# Patient Record
Sex: FEMALE
[De-identification: names, ages, dates, MRNs, and addresses within clinical notes are randomized per-mention and may not be internally consistent; named-entity substitution may affect disease eponyms.]

---

## 2018-12-06 ENCOUNTER — APPOINTMENT (OUTPATIENT)
Dept: CARDIOLOGY | Facility: CLINIC | Age: 83
End: 2018-12-06

## 2018-12-06 PROBLEM — Z00.00 ENCOUNTER FOR PREVENTIVE HEALTH EXAMINATION: Status: ACTIVE | Noted: 2018-12-06

## 2018-12-21 ENCOUNTER — APPOINTMENT (OUTPATIENT)
Dept: CARDIOLOGY | Facility: CLINIC | Age: 83
End: 2018-12-21
Payer: MEDICARE

## 2018-12-21 DIAGNOSIS — R09.89 OTHER SPECIFIED SYMPTOMS AND SIGNS INVOLVING THE CIRCULATORY AND RESPIRATORY SYSTEMS: ICD-10-CM

## 2018-12-21 PROCEDURE — 93880 EXTRACRANIAL BILAT STUDY: CPT

## 2018-12-23 PROBLEM — R09.89 CAROTID BRUIT: Status: ACTIVE | Noted: 2018-12-23

## 2019-05-28 ENCOUNTER — NON-APPOINTMENT (OUTPATIENT)
Age: 84
End: 2019-05-28

## 2019-05-28 ENCOUNTER — APPOINTMENT (OUTPATIENT)
Dept: CARDIOLOGY | Facility: CLINIC | Age: 84
End: 2019-05-28
Payer: MEDICARE

## 2019-05-28 VITALS
HEART RATE: 56 BPM | SYSTOLIC BLOOD PRESSURE: 134 MMHG | DIASTOLIC BLOOD PRESSURE: 82 MMHG | HEIGHT: 61 IN | BODY MASS INDEX: 28.7 KG/M2 | WEIGHT: 152 LBS | RESPIRATION RATE: 16 BRPM

## 2019-05-28 PROCEDURE — 99204 OFFICE O/P NEW MOD 45 MIN: CPT

## 2019-05-28 PROCEDURE — 93000 ELECTROCARDIOGRAM COMPLETE: CPT

## 2019-06-10 ENCOUNTER — APPOINTMENT (OUTPATIENT)
Dept: CARDIOLOGY | Facility: CLINIC | Age: 84
End: 2019-06-10
Payer: MEDICARE

## 2019-06-10 DIAGNOSIS — R42 DIZZINESS AND GIDDINESS: ICD-10-CM

## 2019-06-10 PROCEDURE — 93306 TTE W/DOPPLER COMPLETE: CPT

## 2019-06-17 PROBLEM — R42 DIZZINESS: Status: ACTIVE | Noted: 2018-12-23

## 2019-06-18 RX ORDER — LOSARTAN POTASSIUM 100 MG/1
100 TABLET, FILM COATED ORAL
Qty: 90 | Refills: 0 | Status: ACTIVE | COMMUNITY

## 2019-06-18 RX ORDER — MONTELUKAST SODIUM 10 MG/1
10 TABLET, FILM COATED ORAL DAILY
Qty: 90 | Refills: 3 | Status: ACTIVE | COMMUNITY

## 2019-06-18 RX ORDER — POTASSIUM CHLORIDE 750 MG/1
10 TABLET, FILM COATED, EXTENDED RELEASE ORAL DAILY
Refills: 0 | Status: ACTIVE | COMMUNITY

## 2019-06-18 RX ORDER — AMLODIPINE BESYLATE 5 MG/1
5 TABLET ORAL DAILY
Qty: 90 | Refills: 0 | Status: ACTIVE | COMMUNITY

## 2020-01-28 ENCOUNTER — LABORATORY RESULT (OUTPATIENT)
Age: 85
End: 2020-01-28

## 2020-01-29 ENCOUNTER — NON-APPOINTMENT (OUTPATIENT)
Age: 85
End: 2020-01-29

## 2020-01-29 ENCOUNTER — APPOINTMENT (OUTPATIENT)
Dept: CARDIOLOGY | Facility: CLINIC | Age: 85
End: 2020-01-29
Payer: MEDICARE

## 2020-01-29 VITALS
HEIGHT: 61 IN | RESPIRATION RATE: 16 BRPM | SYSTOLIC BLOOD PRESSURE: 130 MMHG | BODY MASS INDEX: 27.75 KG/M2 | HEART RATE: 60 BPM | DIASTOLIC BLOOD PRESSURE: 84 MMHG | WEIGHT: 147 LBS

## 2020-01-29 PROCEDURE — 93000 ELECTROCARDIOGRAM COMPLETE: CPT | Mod: 59

## 2020-01-29 PROCEDURE — 99214 OFFICE O/P EST MOD 30 MIN: CPT

## 2020-01-29 PROCEDURE — 93224 XTRNL ECG REC UP TO 48 HRS: CPT

## 2020-01-29 RX ORDER — ROSUVASTATIN CALCIUM 10 MG/1
10 TABLET, FILM COATED ORAL
Qty: 90 | Refills: 1 | Status: ACTIVE | COMMUNITY
Start: 1900-01-01 | End: 1900-01-01

## 2020-02-13 ENCOUNTER — NON-APPOINTMENT (OUTPATIENT)
Age: 85
End: 2020-02-13

## 2020-02-19 ENCOUNTER — APPOINTMENT (OUTPATIENT)
Dept: CV DIAGNOSTICS | Facility: HOSPITAL | Age: 85
End: 2020-02-19

## 2020-02-19 ENCOUNTER — OUTPATIENT (OUTPATIENT)
Dept: OUTPATIENT SERVICES | Facility: HOSPITAL | Age: 85
LOS: 1 days | End: 2020-02-19
Payer: MEDICARE

## 2020-02-19 DIAGNOSIS — R06.09 OTHER FORMS OF DYSPNEA: ICD-10-CM

## 2020-02-19 PROCEDURE — A9500: CPT

## 2020-02-19 PROCEDURE — 93017 CV STRESS TEST TRACING ONLY: CPT

## 2020-02-19 PROCEDURE — 93016 CV STRESS TEST SUPVJ ONLY: CPT

## 2020-02-19 PROCEDURE — 78452 HT MUSCLE IMAGE SPECT MULT: CPT | Mod: 26

## 2020-02-19 PROCEDURE — 93018 CV STRESS TEST I&R ONLY: CPT

## 2020-02-19 PROCEDURE — 78452 HT MUSCLE IMAGE SPECT MULT: CPT

## 2020-03-05 ENCOUNTER — APPOINTMENT (OUTPATIENT)
Dept: CARDIOLOGY | Facility: CLINIC | Age: 85
End: 2020-03-05
Payer: MEDICARE

## 2020-03-05 VITALS
HEIGHT: 61 IN | WEIGHT: 149 LBS | BODY MASS INDEX: 28.13 KG/M2 | DIASTOLIC BLOOD PRESSURE: 85 MMHG | SYSTOLIC BLOOD PRESSURE: 125 MMHG | HEART RATE: 65 BPM | RESPIRATION RATE: 15 BRPM

## 2020-03-05 DIAGNOSIS — I65.29 OCCLUSION AND STENOSIS OF UNSPECIFIED CAROTID ARTERY: ICD-10-CM

## 2020-03-05 PROCEDURE — 99213 OFFICE O/P EST LOW 20 MIN: CPT

## 2022-12-16 ENCOUNTER — APPOINTMENT (OUTPATIENT)
Dept: CARDIOLOGY | Facility: CLINIC | Age: 87
End: 2022-12-16

## 2022-12-16 ENCOUNTER — NON-APPOINTMENT (OUTPATIENT)
Age: 87
End: 2022-12-16

## 2022-12-16 VITALS
HEIGHT: 60 IN | DIASTOLIC BLOOD PRESSURE: 70 MMHG | RESPIRATION RATE: 16 BRPM | OXYGEN SATURATION: 96 % | HEART RATE: 56 BPM | WEIGHT: 136 LBS | SYSTOLIC BLOOD PRESSURE: 120 MMHG | TEMPERATURE: 97.8 F | BODY MASS INDEX: 26.7 KG/M2

## 2022-12-16 PROCEDURE — 93000 ELECTROCARDIOGRAM COMPLETE: CPT

## 2022-12-16 PROCEDURE — 99214 OFFICE O/P EST MOD 30 MIN: CPT | Mod: 25

## 2022-12-16 NOTE — ASSESSMENT
[FreeTextEntry1] : This is a 89 year year old female here today for follow up cardiac evaluation. \par She has a past medical history significant for hypertension, mitral valve regurgitation, occasional palpitations and occasional ankle edema.\par \par CHIEF COMPLAINT:\par Today she is feeling generally well and does not have any complaints at this time.  She is currently prescribed amlodipine 5 mg daily, losartan 100 mg daily, propanolol 120 mg daily, rosuvastatin 10 mg daily, and potassium supplements 10 M EQ's 1 tablet daily.  She is here with her son to help translate and he states that her primary care doctor took her off propanolol and placed her on metoprolol succinate because propanolol has a side effect of depression.  The patient's son says that she is never depressed and has been on propanolol for "for years.  He states that she was taking her blood pressure at home from her home electronic cuff and were getting elevated readings and is now back on propanolol herself.  He is concerned because she feels that her blood pressure is elevated and she has not been seen in our office since 2020.\par \par She denies fever, chills, weight loss, malaise, rash, alteration bowel habits, weakness, abdominal  pain, bloating, changes in urination, visual disturbances, chest pain, headaches, dizziness, heart palpitations, recent episodes of syncope or falls at this time.\par \par She was born in [place] and has no history of rheumatic fever.  She does not drink excessive caffeine or alcohol. \par \par BLOOD PRESSURE:\par -BP is well controlled in today's visit.  Blood pressure was taken bilaterally on today's exam and is WDL in both arms\par \par -I have discussed the importance of maintaining good BP control and reviewed the newest guidelines with the patient while re-enforcing dietary sodium restrictions to no more than 2-3 g daily, DASH diet, life style modifications as well as the goal of maintaining ideal body weight with the patient today. I have advised the patient to avoid the use of over-the-counter medications/ supplements especially NSAIDS.\par \par I have reviewed with MsTejinder CHRISTINE that serious health consequences can occur when blood pressure is not well controlled and the need for strict compliance with medication and that optimal control can significantly reduce the risk of cardiovascular disease stroke, heart attack and other organ damage. They verbally expressed understanding of the fore mentioned serious health consequences to me today.\par \par BLOOD WORK:\par -New blood work was done 12/9/2022 which demonstrated values WDL A1c 5.8%.\par \par CHOLESTEROL CONTROL:\par -Patient will continue the advised  TLC diet and to continue follow-up for treatment of hyperlipidemia and repeat blood testing with diet and exercise. I have discussed different exercises and the importance of maintenance of optimal body weight. The importance of staying within guidelines and recommendations was stressed to the patient today and they acknowledged that they understand this to me verbally.\par \par  -Ms. KING was educated and advised that failure to follow-up with my medical recommendations to lower cholesterol can result in severe health consequences therefore, they will continuing a low saturated and low fat diet and to avoid excessive carbohydrates to help reduce triglycerides and that lowering LDL levels is associated with a significant decrease in serious cardiac events including but not limited to heart attack stroke and overall death. We will continue lipid lowering agents as advised based on blood test results and the patient understands to call if they develop severe muscle discomfort or if they have a reddish tinted discoloration in their urine.\par \par EDEMA:\par Patient to advised to follow-up if any redness or signs of infection develop on their legs.\par Patient had an educational review of the treatment of leg edema. Patient was advised to keep their legs elevated and take medication prescribed today and avoid sodium containing foods. The patient was advised to call prescribe medications and to notify the office of his condition worsens or they developed shortness of breath.\par \par TESTING/REPORTS:\par -EKG done Dec 16, 2022 which demonstrated regular sinus rhythm with nonspecific ST-T wave changes, BPM of  56 degree AV block.  This is her baseline rhythm.\par \par -The patient had a nuclear stress test February 19, 2020 which demonstrated fixed defects without evidence for ischemia.  \par \par -She also had a Holter monitor done which revealed no significant arrhythmia or heart block.  \par \par -She has normal left ventricular function.  \par \par Electrocardiogram done January 29, 2020 demonstrated normal sinus rhythm rate 60 beats per minutes otherwise markable for left ventricular hypertrophy, left axis deviation, and T-wave inversion in V1 and V2.\par \par PLAN:\par -She will continue with her current medications and will contact the office if she is having any complaints between now and their next follow up appointment.\par -The patient will schedule an Echo Doppler examination to evaluate murmur, left ventricular function, chamber size, and rule out hypertrophy. \par \par I have discussed the plan of care with Ms. BRITTNEY KING  and she  will follow up in 3 months. She is compliant with all of her medications.\par \par The patient understands that aerobic exercises must be increased to minutes 4 times/week and a detailed discussion of lifestyle modification was done today. \par The patient has a good understanding of the diagnosis, treatment plan and lifestyle modification. \par She will contact me at the office for any questions with their care or any changes in their health status.\par \par \par Shantel MOE

## 2022-12-16 NOTE — PHYSICAL EXAM
[General Appearance - Well Developed] : well developed [Normal Appearance] : normal appearance [General Appearance - Well Nourished] : well nourished [Normal Oral Mucosa] : normal oral mucosa [No Oral Pallor] : no oral pallor [Normal Oropharynx] : normal oropharynx [Normal Jugular Venous V Waves Present] : normal jugular venous V waves present [Respiration, Rhythm And Depth] : normal respiratory rhythm and effort [Exaggerated Use Of Accessory Muscles For Inspiration] : no accessory muscle use [Auscultation Breath Sounds / Voice Sounds] : lungs were clear to auscultation bilaterally [Bowel Sounds] : normal bowel sounds [Abdomen Soft] : soft [Abdomen Tenderness] : non-tender [Abnormal Walk] : normal gait [Nail Clubbing] : no clubbing of the fingernails [Cyanosis, Localized] : no localized cyanosis [Petechial Hemorrhages (___cm)] : no petechial hemorrhages [Skin Color & Pigmentation] : normal skin color and pigmentation [Skin Turgor] : normal skin turgor [] : no rash [No Venous Stasis] : no venous stasis [Oriented To Time, Place, And Person] : oriented to person, place, and time [Impaired Insight] : insight and judgment were intact [No Anxiety] : not feeling anxious [II] : a grade 2 [No Abnormalities] : the abdominal aorta was not enlarged and no bruit was heard [No Pitting Edema] : no pitting edema present [Well Developed] : well developed [Well Nourished] : well nourished [No Acute Distress] : no acute distress [Normal Conjunctiva] : normal conjunctiva [Normal Venous Pressure] : normal venous pressure [No Carotid Bruit] : no carotid bruit [Normal S1, S2] : normal S1, S2 [No Murmur] : no murmur [No Rub] : no rub [5th Left ICS - MCL] : palpated at the 5th LICS in the midclavicular line [Normal] : normal [No Precordial Heave] : no precordial heave was noted [Normal Rate] : normal [Rhythm Regular] : regular [Normal S1] : normal S1 [Normal S2] : normal S2 [No Gallop] : no gallop heard [I] : a grade 1 [___ +] : bilateral [unfilled]U+ pitting edema to the ankles [2+] : left 2+ [Clear Lung Fields] : clear lung fields [Good Air Entry] : good air entry [No Respiratory Distress] : no respiratory distress  [Soft] : abdomen soft [Non Tender] : non-tender [No Masses/organomegaly] : no masses/organomegaly [Normal Bowel Sounds] : normal bowel sounds [Normal Gait] : normal gait [No Edema] : no edema [No Cyanosis] : no cyanosis [No Clubbing] : no clubbing [No Varicosities] : no varicosities [No Rash] : no rash [No Skin Lesions] : no skin lesions [Moves all extremities] : moves all extremities [No Focal Deficits] : no focal deficits [Normal Speech] : normal speech [Alert and Oriented] : alert and oriented [Normal memory] : normal memory [S3] : no S3 [S4] : no S4

## 2022-12-16 NOTE — DISCUSSION/SUMMARY
[FreeTextEntry1] : Dr. Umanzor-(PRIOR VISIT and PMH WITH Dr. Umanzor): \par This is an 85-year-old female past medical history significant for hypertension, mitral valve regurgitation, occasional palpitations and occasssional ankle edema,, who comes in for cardiac consultation.\par She denies chest pain, dizziness,  or syncope.  She does get dyspnea on exertion when walking up stairs.\par \par The patient had a nuclear stress test February 19, 2020 which demonstrated fixed defects without evidence for ischemia.  \par \par She also had a Holter monitor done which revealed no significant arrhythmia or heart block.  \par She has normal left ventricular function.  \par \par Electrocardiogram done January 29, 2020 demonstrated normal sinus rhythm rate 60 beats per minutes otherwise markable for left ventricular hypertrophy, left axis deviation, and T-wave inversion in V1 and V2.\par \par \par

## 2022-12-16 NOTE — REASON FOR VISIT
[Follow-Up - Clinic] : a clinic follow-up of [Hyperlipidemia] : hyperlipidemia [Hypertension] : hypertension [Mitral Regurgitation] : mitral regurgitation [Palpitations] : palpitations [FreeTextEntry1] : enlarged aorta found on past cxr

## 2023-01-03 RX ORDER — PROPRANOLOL HYDROCHLORIDE 120 MG/1
120 CAPSULE, EXTENDED RELEASE ORAL
Qty: 90 | Refills: 0 | Status: DISCONTINUED | COMMUNITY
Start: 2020-01-15 | End: 2023-01-03

## 2023-02-16 ENCOUNTER — APPOINTMENT (OUTPATIENT)
Dept: CARDIOLOGY | Facility: CLINIC | Age: 88
End: 2023-02-16

## 2023-02-21 ENCOUNTER — APPOINTMENT (OUTPATIENT)
Dept: CARDIOLOGY | Facility: CLINIC | Age: 88
End: 2023-02-21

## 2023-02-27 ENCOUNTER — APPOINTMENT (OUTPATIENT)
Dept: CARDIOLOGY | Facility: CLINIC | Age: 88
End: 2023-02-27
Payer: MEDICARE

## 2023-02-27 VITALS
WEIGHT: 147 LBS | SYSTOLIC BLOOD PRESSURE: 124 MMHG | OXYGEN SATURATION: 96 % | DIASTOLIC BLOOD PRESSURE: 74 MMHG | RESPIRATION RATE: 16 BRPM | TEMPERATURE: 97.9 F | HEIGHT: 60 IN | HEART RATE: 60 BPM | BODY MASS INDEX: 28.86 KG/M2

## 2023-02-27 DIAGNOSIS — R00.2 PALPITATIONS: ICD-10-CM

## 2023-02-27 DIAGNOSIS — I51.7 CARDIOMEGALY: ICD-10-CM

## 2023-02-27 DIAGNOSIS — R06.00 DYSPNEA, UNSPECIFIED: ICD-10-CM

## 2023-02-27 PROCEDURE — 99214 OFFICE O/P EST MOD 30 MIN: CPT | Mod: 25

## 2023-02-27 PROCEDURE — 93306 TTE W/DOPPLER COMPLETE: CPT

## 2023-02-27 RX ORDER — METOPROLOL SUCCINATE 50 MG/1
50 TABLET, EXTENDED RELEASE ORAL
Qty: 90 | Refills: 1 | Status: DISCONTINUED | COMMUNITY
Start: 2023-01-03 | End: 2023-02-27

## 2023-02-27 RX ORDER — PROPRANOLOL HYDROCHLORIDE 120 MG/1
120 CAPSULE, EXTENDED RELEASE ORAL
Qty: 90 | Refills: 1 | Status: ACTIVE | COMMUNITY
Start: 2023-02-27

## 2023-02-27 NOTE — PHYSICAL EXAM
[Well Developed] : well developed [Well Nourished] : well nourished [No Acute Distress] : no acute distress [Normal Venous Pressure] : normal venous pressure [No Carotid Bruit] : no carotid bruit [Normal S1, S2] : normal S1, S2 [No Murmur] : no murmur [No Rub] : no rub [I] : a grade 1 [___ +] : bilateral [unfilled]U+ pitting edema to the ankles [Clear Lung Fields] : clear lung fields [Good Air Entry] : good air entry [No Respiratory Distress] : no respiratory distress  [Soft] : abdomen soft [Non Tender] : non-tender [No Masses/organomegaly] : no masses/organomegaly [Normal Bowel Sounds] : normal bowel sounds [Normal Gait] : normal gait [No Edema] : no edema [No Cyanosis] : no cyanosis [No Clubbing] : no clubbing [No Varicosities] : no varicosities [No Rash] : no rash [No Skin Lesions] : no skin lesions [Moves all extremities] : moves all extremities [No Focal Deficits] : no focal deficits [Normal Speech] : normal speech [Alert and Oriented] : alert and oriented [Normal memory] : normal memory [General Appearance - Well Developed] : well developed [Normal Appearance] : normal appearance [General Appearance - Well Nourished] : well nourished [Normal Conjunctiva] : the conjunctiva exhibited no abnormalities [Normal Oral Mucosa] : normal oral mucosa [No Oral Pallor] : no oral pallor [Normal Oropharynx] : normal oropharynx [Normal Jugular Venous V Waves Present] : normal jugular venous V waves present [Respiration, Rhythm And Depth] : normal respiratory rhythm and effort [Exaggerated Use Of Accessory Muscles For Inspiration] : no accessory muscle use [Auscultation Breath Sounds / Voice Sounds] : lungs were clear to auscultation bilaterally [Bowel Sounds] : normal bowel sounds [Abdomen Soft] : soft [Abdomen Tenderness] : non-tender [Abnormal Walk] : normal gait [Nail Clubbing] : no clubbing of the fingernails [Cyanosis, Localized] : no localized cyanosis [Petechial Hemorrhages (___cm)] : no petechial hemorrhages [Skin Color & Pigmentation] : normal skin color and pigmentation [Skin Turgor] : normal skin turgor [] : no rash [No Venous Stasis] : no venous stasis [Oriented To Time, Place, And Person] : oriented to person, place, and time [Impaired Insight] : insight and judgment were intact [No Anxiety] : not feeling anxious [5th Left ICS - MCL] : palpated at the 5th LICS in the midclavicular line [Normal] : normal [No Precordial Heave] : no precordial heave was noted [Normal Rate] : normal [Rhythm Regular] : regular [Normal S1] : normal S1 [Normal S2] : normal S2 [No Gallop] : no gallop heard [II] : a grade 2 [2+] : left 2+ [No Abnormalities] : the abdominal aorta was not enlarged and no bruit was heard [No Pitting Edema] : no pitting edema present [S3] : no S3 [S4] : no S4

## 2023-02-28 RX ORDER — ASPIRIN 81 MG/1
81 TABLET ORAL
Qty: 90 | Refills: 1 | Status: ACTIVE | COMMUNITY
Start: 2023-02-28

## 2023-03-19 PROBLEM — R00.2 PALPITATIONS: Status: ACTIVE | Noted: 2020-01-29

## 2023-03-19 PROBLEM — I51.7 LEFT VENTRICULAR HYPERTROPHY: Status: ACTIVE | Noted: 2020-01-29

## 2023-03-19 PROBLEM — R06.00 DOE (DYSPNEA ON EXERTION): Status: ACTIVE | Noted: 2019-05-28

## 2024-05-20 ENCOUNTER — INPATIENT (INPATIENT)
Facility: HOSPITAL | Age: 89
LOS: 2 days | Discharge: HOME | End: 2024-05-23
Attending: INTERNAL MEDICINE | Admitting: INTERNAL MEDICINE
Payer: MEDICARE

## 2024-05-20 VITALS
OXYGEN SATURATION: 97 % | TEMPERATURE: 98 F | SYSTOLIC BLOOD PRESSURE: 188 MMHG | RESPIRATION RATE: 19 BRPM | HEART RATE: 69 BPM | DIASTOLIC BLOOD PRESSURE: 77 MMHG

## 2024-05-20 LAB
APTT BLD: 33.1 SEC — SIGNIFICANT CHANGE UP (ref 24.5–35.6)
BASE EXCESS BLDV CALC-SCNC: 0.6 MMOL/L — SIGNIFICANT CHANGE UP (ref -2–3)
BASOPHILS # BLD AUTO: 0.08 K/UL — SIGNIFICANT CHANGE UP (ref 0–0.2)
BASOPHILS NFR BLD AUTO: 0.9 % — SIGNIFICANT CHANGE UP (ref 0–2)
CO2 BLDV-SCNC: 28 MMOL/L — HIGH (ref 22–26)
EOSINOPHIL # BLD AUTO: 0.16 K/UL — SIGNIFICANT CHANGE UP (ref 0–0.5)
EOSINOPHIL NFR BLD AUTO: 1.8 % — SIGNIFICANT CHANGE UP (ref 0–6)
GAS PNL BLDV: SIGNIFICANT CHANGE UP
HCO3 BLDV-SCNC: 27 MMOL/L — SIGNIFICANT CHANGE UP (ref 22–29)
HCT VFR BLD CALC: 39.5 % — SIGNIFICANT CHANGE UP (ref 34.5–45)
HGB BLD-MCNC: 12.6 G/DL — SIGNIFICANT CHANGE UP (ref 11.5–15.5)
IANC: 6.87 K/UL — SIGNIFICANT CHANGE UP (ref 1.8–7.4)
IMM GRANULOCYTES NFR BLD AUTO: 0.3 % — SIGNIFICANT CHANGE UP (ref 0–0.9)
INR BLD: 1.04 RATIO — SIGNIFICANT CHANGE UP (ref 0.85–1.18)
LYMPHOCYTES # BLD AUTO: 1.32 K/UL — SIGNIFICANT CHANGE UP (ref 1–3.3)
LYMPHOCYTES # BLD AUTO: 14.5 % — SIGNIFICANT CHANGE UP (ref 13–44)
MCHC RBC-ENTMCNC: 27 PG — SIGNIFICANT CHANGE UP (ref 27–34)
MCHC RBC-ENTMCNC: 31.9 GM/DL — LOW (ref 32–36)
MCV RBC AUTO: 84.8 FL — SIGNIFICANT CHANGE UP (ref 80–100)
MONOCYTES # BLD AUTO: 0.65 K/UL — SIGNIFICANT CHANGE UP (ref 0–0.9)
MONOCYTES NFR BLD AUTO: 7.1 % — SIGNIFICANT CHANGE UP (ref 2–14)
NEUTROPHILS # BLD AUTO: 6.87 K/UL — SIGNIFICANT CHANGE UP (ref 1.8–7.4)
NEUTROPHILS NFR BLD AUTO: 75.4 % — SIGNIFICANT CHANGE UP (ref 43–77)
NRBC # BLD: 0 /100 WBCS — SIGNIFICANT CHANGE UP (ref 0–0)
NRBC # FLD: 0 K/UL — SIGNIFICANT CHANGE UP (ref 0–0)
PCO2 BLDV: 47 MMHG — SIGNIFICANT CHANGE UP (ref 39–52)
PH BLDV: 7.36 — SIGNIFICANT CHANGE UP (ref 7.32–7.43)
PLATELET # BLD AUTO: 222 K/UL — SIGNIFICANT CHANGE UP (ref 150–400)
PO2 BLDV: 35 MMHG — SIGNIFICANT CHANGE UP (ref 25–45)
PROTHROM AB SERPL-ACNC: 11.7 SEC — SIGNIFICANT CHANGE UP (ref 9.5–13)
RBC # BLD: 4.66 M/UL — SIGNIFICANT CHANGE UP (ref 3.8–5.2)
RBC # FLD: 13.2 % — SIGNIFICANT CHANGE UP (ref 10.3–14.5)
SAO2 % BLDV: 53 % — LOW (ref 67–88)
WBC # BLD: 9.11 K/UL — SIGNIFICANT CHANGE UP (ref 3.8–10.5)
WBC # FLD AUTO: 9.11 K/UL — SIGNIFICANT CHANGE UP (ref 3.8–10.5)

## 2024-05-20 PROCEDURE — 99285 EMERGENCY DEPT VISIT HI MDM: CPT

## 2024-05-20 PROCEDURE — 71045 X-RAY EXAM CHEST 1 VIEW: CPT | Mod: 26

## 2024-05-20 RX ORDER — ACETAMINOPHEN 500 MG
1000 TABLET ORAL ONCE
Refills: 0 | Status: COMPLETED | OUTPATIENT
Start: 2024-05-20 | End: 2024-05-20

## 2024-05-20 RX ORDER — ONDANSETRON 8 MG/1
4 TABLET, FILM COATED ORAL ONCE
Refills: 0 | Status: COMPLETED | OUTPATIENT
Start: 2024-05-20 | End: 2024-05-20

## 2024-05-20 RX ADMIN — ONDANSETRON 4 MILLIGRAM(S): 8 TABLET, FILM COATED ORAL at 23:13

## 2024-05-20 RX ADMIN — Medication 400 MILLIGRAM(S): at 23:13

## 2024-05-20 NOTE — ED PROVIDER NOTE - OBJECTIVE STATEMENT
89 yo hx HTN HLD here for epigastric and RUQ pain x1 day associated with nausea. pain is worse post prandial, and has had this once last week which self resolved. says the pain radiates to back. also notes dysuria. denies any fevers, chills, chest pain, sob, diarrhea, is having normal BM. surgical history significant for  section.

## 2024-05-20 NOTE — ED ADULT NURSE NOTE - OBJECTIVE STATEMENT
Spoke to mom. Letter sent to [facility].  Pt still with fever last night to 105. Only giving 1 tab (200mg) ibuprofen. Advised ibuprofen can give 2 tabs every 6 hours, and tylenol every 4 hours. If still with fever on 1/26 advised to call office for follow up appt to recheck. Mom verbalized understanding.    Pt c/o of epigastric pain after eatting around 1pm. Pt states pain is still present and has not gotten better. Denies any radiation or CP

## 2024-05-20 NOTE — ED ADULT TRIAGE NOTE - CHIEF COMPLAINT QUOTE
C/o abdominal pain x 1 day. endorsing upper to epigastric pain, upper back pain, dysuria and nausea. Hx HTN, HLD

## 2024-05-20 NOTE — ED PROVIDER NOTE - CLINICAL SUMMARY MEDICAL DECISION MAKING FREE TEXT BOX
89 y hx HTN HLD epigastric and RUQ pain w nausea, dysuria, on arrival hypertensive /77 HR 69 afebrile, pt tender RUQ and epigastric region w R CVA tenderness, lungs clear ot auscultation, CT r/o cholecystitis vs nephrolithiasis and ACS labs evaluate for atypical chest pain and nausea in w HTN HLD, and check UA given dysuria. pt afebrile wlel appearing no acute concern for septic stone but will check UA.

## 2024-05-20 NOTE — ED PROVIDER NOTE - ATTENDING CONTRIBUTION TO CARE
89 yo female with hx of HTN HLD presenting with epigastric pain x 1 day with radiation throughout the abdomen.  also endorses back pain.  did not take anything for her symptoms.  had similar pain last week which self resolved.  associated with eating.  denies chest pain and shortness of breath.    on exam, patient has epigastric and ruq tenderness, RRR, normal inspiratory effort, no peripheral edema, no suprapubic tenderness, bilateral pulses equal.    etiology of symptoms broad; rule out ACS vs abdominal pathology such as biliary disease vs pancreatitis;  not concerned for dissection given history and physical exam.      will obtain labs, troponin, ekg cxr, ctap, urine and re eval + dispo accordingly.    if workup negative and patient feels better, will dc with PCP fu

## 2024-05-20 NOTE — ED ADULT NURSE NOTE - NS ED NURSE LEVEL OF CONSCIOUSNESS ORIENTATION
Lm in regards to lab results and provider instructions, advise to call back to schedule apt to discuss abnormal lab results in person.   Oriented - self; Oriented - place; Oriented - time

## 2024-05-20 NOTE — ED PROVIDER NOTE - PHYSICAL EXAMINATION
GENERAL: well appearing in no acute distress, non-toxic appearing  CARDIAC: regular rate and rhythm, normal S1S2, no appreciable murmurs, 2+ pulses in UE/LE b/l  PULM: normal breath sounds, clear to ascultation bilaterally, no rales, rhonchi, wheezing  GI: abdomen nondistended, soft, epigastric and RUQ pain, no guarding, rebound tenderness  : R CVA tenderness, no suprapubic tenderness  NEURO: no focal motor or sensory deficits, CN2-12 intact, normal speech, PERRLA, EOMI, normal gait, AAOx3  MSK: no peripheral edema, no calf tenderness b/l

## 2024-05-21 ENCOUNTER — TRANSCRIPTION ENCOUNTER (OUTPATIENT)
Age: 89
End: 2024-05-21

## 2024-05-21 DIAGNOSIS — K80.50 CALCULUS OF BILE DUCT WITHOUT CHOLANGITIS OR CHOLECYSTITIS WITHOUT OBSTRUCTION: ICD-10-CM

## 2024-05-21 LAB
ALBUMIN SERPL ELPH-MCNC: 4 G/DL — SIGNIFICANT CHANGE UP (ref 3.3–5)
ALP SERPL-CCNC: 85 U/L — SIGNIFICANT CHANGE UP (ref 40–120)
ALT FLD-CCNC: 57 U/L — HIGH (ref 4–33)
ANION GAP SERPL CALC-SCNC: 15 MMOL/L — HIGH (ref 7–14)
APPEARANCE UR: CLEAR — SIGNIFICANT CHANGE UP
AST SERPL-CCNC: 64 U/L — HIGH (ref 4–32)
BACTERIA # UR AUTO: NEGATIVE /HPF — SIGNIFICANT CHANGE UP
BILIRUB DIRECT SERPL-MCNC: 1.2 MG/DL — HIGH (ref 0–0.3)
BILIRUB SERPL-MCNC: 1.6 MG/DL — HIGH (ref 0.2–1.2)
BILIRUB UR-MCNC: NEGATIVE — SIGNIFICANT CHANGE UP
BLD GP AB SCN SERPL QL: NEGATIVE — SIGNIFICANT CHANGE UP
BUN SERPL-MCNC: 20 MG/DL — SIGNIFICANT CHANGE UP (ref 7–23)
CALCIUM SERPL-MCNC: 9.8 MG/DL — SIGNIFICANT CHANGE UP (ref 8.4–10.5)
CAST: 0 /LPF — SIGNIFICANT CHANGE UP (ref 0–4)
CHLORIDE SERPL-SCNC: 101 MMOL/L — SIGNIFICANT CHANGE UP (ref 98–107)
CO2 SERPL-SCNC: 21 MMOL/L — LOW (ref 22–31)
COLOR SPEC: YELLOW — SIGNIFICANT CHANGE UP
CREAT SERPL-MCNC: 0.71 MG/DL — SIGNIFICANT CHANGE UP (ref 0.5–1.3)
DIFF PNL FLD: NEGATIVE — SIGNIFICANT CHANGE UP
EGFR: 81 ML/MIN/1.73M2 — SIGNIFICANT CHANGE UP
FLUAV AG NPH QL: SIGNIFICANT CHANGE UP
FLUBV AG NPH QL: SIGNIFICANT CHANGE UP
GLUCOSE SERPL-MCNC: 106 MG/DL — HIGH (ref 70–99)
GLUCOSE UR QL: NEGATIVE MG/DL — SIGNIFICANT CHANGE UP
KETONES UR-MCNC: ABNORMAL MG/DL
LEUKOCYTE ESTERASE UR-ACNC: NEGATIVE — SIGNIFICANT CHANGE UP
LIDOCAIN IGE QN: 20 U/L — SIGNIFICANT CHANGE UP (ref 7–60)
NITRITE UR-MCNC: NEGATIVE — SIGNIFICANT CHANGE UP
PH UR: 6 — SIGNIFICANT CHANGE UP (ref 5–8)
POTASSIUM SERPL-MCNC: 4.4 MMOL/L — SIGNIFICANT CHANGE UP (ref 3.5–5.3)
POTASSIUM SERPL-SCNC: 4.4 MMOL/L — SIGNIFICANT CHANGE UP (ref 3.5–5.3)
PROT SERPL-MCNC: 7.1 G/DL — SIGNIFICANT CHANGE UP (ref 6–8.3)
PROT UR-MCNC: SIGNIFICANT CHANGE UP MG/DL
RBC CASTS # UR COMP ASSIST: 1 /HPF — SIGNIFICANT CHANGE UP (ref 0–4)
RH IG SCN BLD-IMP: POSITIVE — SIGNIFICANT CHANGE UP
RSV RNA NPH QL NAA+NON-PROBE: SIGNIFICANT CHANGE UP
SARS-COV-2 RNA SPEC QL NAA+PROBE: SIGNIFICANT CHANGE UP
SODIUM SERPL-SCNC: 137 MMOL/L — SIGNIFICANT CHANGE UP (ref 135–145)
SP GR SPEC: 1.02 — SIGNIFICANT CHANGE UP (ref 1–1.03)
SQUAMOUS # UR AUTO: 0 /HPF — SIGNIFICANT CHANGE UP (ref 0–5)
TROPONIN T, HIGH SENSITIVITY RESULT: 19 NG/L — SIGNIFICANT CHANGE UP
TROPONIN T, HIGH SENSITIVITY RESULT: 20 NG/L — SIGNIFICANT CHANGE UP
UROBILINOGEN FLD QL: 0.2 MG/DL — SIGNIFICANT CHANGE UP (ref 0.2–1)
WBC UR QL: 1 /HPF — SIGNIFICANT CHANGE UP (ref 0–5)

## 2024-05-21 PROCEDURE — 93010 ELECTROCARDIOGRAM REPORT: CPT

## 2024-05-21 PROCEDURE — 74177 CT ABD & PELVIS W/CONTRAST: CPT | Mod: 26,MC

## 2024-05-21 RX ORDER — PIPERACILLIN AND TAZOBACTAM 4; .5 G/20ML; G/20ML
3.38 INJECTION, POWDER, LYOPHILIZED, FOR SOLUTION INTRAVENOUS ONCE
Refills: 0 | Status: COMPLETED | OUTPATIENT
Start: 2024-05-21 | End: 2024-05-21

## 2024-05-21 RX ORDER — OXYCODONE HYDROCHLORIDE 5 MG/1
2.5 TABLET ORAL EVERY 4 HOURS
Refills: 0 | Status: DISCONTINUED | OUTPATIENT
Start: 2024-05-21 | End: 2024-05-23

## 2024-05-21 RX ORDER — ATORVASTATIN CALCIUM 80 MG/1
40 TABLET, FILM COATED ORAL AT BEDTIME
Refills: 0 | Status: DISCONTINUED | OUTPATIENT
Start: 2024-05-21 | End: 2024-05-21

## 2024-05-21 RX ORDER — DEXTROSE MONOHYDRATE, SODIUM CHLORIDE, AND POTASSIUM CHLORIDE 50; .745; 4.5 G/1000ML; G/1000ML; G/1000ML
1000 INJECTION, SOLUTION INTRAVENOUS
Refills: 0 | Status: DISCONTINUED | OUTPATIENT
Start: 2024-05-22 | End: 2024-05-22

## 2024-05-21 RX ORDER — ACETAMINOPHEN 500 MG
650 TABLET ORAL EVERY 6 HOURS
Refills: 0 | Status: DISCONTINUED | OUTPATIENT
Start: 2024-05-21 | End: 2024-05-22

## 2024-05-21 RX ORDER — AMLODIPINE BESYLATE 2.5 MG/1
5 TABLET ORAL DAILY
Refills: 0 | Status: DISCONTINUED | OUTPATIENT
Start: 2024-05-21 | End: 2024-05-23

## 2024-05-21 RX ORDER — ROSUVASTATIN CALCIUM 5 MG/1
10 TABLET ORAL AT BEDTIME
Refills: 0 | Status: DISCONTINUED | OUTPATIENT
Start: 2024-05-21 | End: 2024-05-23

## 2024-05-21 RX ORDER — ENOXAPARIN SODIUM 100 MG/ML
40 INJECTION SUBCUTANEOUS EVERY 24 HOURS
Refills: 0 | Status: DISCONTINUED | OUTPATIENT
Start: 2024-05-21 | End: 2024-05-23

## 2024-05-21 RX ORDER — MONTELUKAST 4 MG/1
10 TABLET, CHEWABLE ORAL DAILY
Refills: 0 | Status: DISCONTINUED | OUTPATIENT
Start: 2024-05-21 | End: 2024-05-23

## 2024-05-21 RX ORDER — PIPERACILLIN AND TAZOBACTAM 4; .5 G/20ML; G/20ML
3.38 INJECTION, POWDER, LYOPHILIZED, FOR SOLUTION INTRAVENOUS EVERY 8 HOURS
Refills: 0 | Status: DISCONTINUED | OUTPATIENT
Start: 2024-05-22 | End: 2024-05-22

## 2024-05-21 RX ORDER — PIPERACILLIN AND TAZOBACTAM 4; .5 G/20ML; G/20ML
3.38 INJECTION, POWDER, LYOPHILIZED, FOR SOLUTION INTRAVENOUS ONCE
Refills: 0 | Status: COMPLETED | OUTPATIENT
Start: 2024-05-22 | End: 2024-05-22

## 2024-05-21 RX ADMIN — ENOXAPARIN SODIUM 40 MILLIGRAM(S): 100 INJECTION SUBCUTANEOUS at 18:27

## 2024-05-21 RX ADMIN — PIPERACILLIN AND TAZOBACTAM 25 GRAM(S): 4; .5 INJECTION, POWDER, LYOPHILIZED, FOR SOLUTION INTRAVENOUS at 11:17

## 2024-05-21 RX ADMIN — PIPERACILLIN AND TAZOBACTAM 25 GRAM(S): 4; .5 INJECTION, POWDER, LYOPHILIZED, FOR SOLUTION INTRAVENOUS at 18:27

## 2024-05-21 RX ADMIN — PIPERACILLIN AND TAZOBACTAM 200 GRAM(S): 4; .5 INJECTION, POWDER, LYOPHILIZED, FOR SOLUTION INTRAVENOUS at 07:35

## 2024-05-21 RX ADMIN — MONTELUKAST 10 MILLIGRAM(S): 4 TABLET, CHEWABLE ORAL at 11:18

## 2024-05-21 RX ADMIN — PIPERACILLIN AND TAZOBACTAM 200 GRAM(S): 4; .5 INJECTION, POWDER, LYOPHILIZED, FOR SOLUTION INTRAVENOUS at 03:56

## 2024-05-21 NOTE — H&P ADULT - NSHPPHYSICALEXAM_GEN_ALL_CORE
VITAL SIGNS:  Vital Signs Last 24 Hrs  T(C): 36.7 (21 May 2024 04:38), Max: 36.7 (20 May 2024 21:52)  T(F): 98 (21 May 2024 04:38), Max: 98.1 (21 May 2024 01:03)  HR: 68 (21 May 2024 04:38) (61 - 69)  BP: 143/75 (21 May 2024 04:38) (143/75 - 188/77)  BP(mean): --  RR: 18 (21 May 2024 04:38) (18 - 19)  SpO2: 99% (21 May 2024 04:38) (97% - 99%)    Parameters below as of 21 May 2024 04:38  Patient On (Oxygen Delivery Method): room air          PHYSICAL EXAM:    General: NAD, Sitting in bed comfortably  Cardio: RRR,  Resp: Good effort,  GI/Abd: Soft, RUQ tenderness, ND, no rebound/guarding, no masses palpated  Musculoskeletal: All 4 extremities moving spontaneously, no limitations

## 2024-05-21 NOTE — PATIENT PROFILE ADULT - OVER THE PAST TWO WEEKS, HAVE YOU FELT LITTLE INTEREST OR PLEASURE IN DOING THINGS?
2023  I called [facility] [place] and provided 1 time fill of xarelto 1mg/ml medication with directions on file of 2.8mls Q 8 hours quantity 310mls (2 bottles). They confirmed they have this on hand and it will be ready within the hour. No PA needed at this time.    I called foster Mom Odalis. Odalis told me she gave the last dose on hand at home this morning. Odalis confirmed she is giving 2.8mls every 8 hours. She shared she was told this was not a medication he would always need to be on. I told her at a minimum he needs to continue this medication until he is evaluated in clinic on 2023. We did discuss how this appointment was orginally scheduled 2023 with Dr Josef Thomas and that she called and rescheduled the appointment to 2023. I clarified with Odalis that she requested his ongoing evalutions to be scheduled at [facility]. She confirmed she does not yet have an appointment scheduled with them yet. I encouraged her to call them and schedule this follow up within 1 month of our appointment on 2023 so they can provide future refills for his medications.   Odalis is aware the medication is filled at the [facility] the [place] on site at [facility] and will be ready for  in 1 hour.  Odalis verbalized understanding of discussion.  
2023  We received a fax from [facility] Cardiology team that he is scheduled on 2023 to see Dr Erich Lopez.   
Mom called regarding refill for xarelto medication and whether he will continue on the medication. LARA Ellington will be reaching out to Dr. Navarro and will give mom a call back.   
no

## 2024-05-21 NOTE — PATIENT PROFILE ADULT - FALL HARM RISK - HARM RISK INTERVENTIONS

## 2024-05-21 NOTE — H&P ADULT - ASSESSMENT
89F presenting with clinical acute cholecystitis and possible choledocholithiasis.    PLAN:  - Admit to B team surgery under Dr. Lee.  - Will add on to OR for tomorrow for laparoscopic cholecystectomy, possible IOC, possible spyglass choledochoscope.  - Low fat diet  - NPO @ MN  - Pre-op labs  - Pain control as needed  - VTE ppx    Discussed with attending surgeon Dr. Lee.    LESVIA Zurita, PGY-4  [facility]  B Team Surgery  [id number]

## 2024-05-21 NOTE — PATIENT PROFILE ADULT - NSTRANSFERBELONGINGSDISPO_GEN_A_NUR
Patient:   KAI OWEN            MRN: [id number]            FIN: [id number]               Age:   89 years     Sex:  FEMALE     :  22   Associated Diagnoses:   None   Author:   ANGELA JONES      Postoperative Information      Postoperative Information   Postoperative Information:          Preoperative Diagnosis: Retroperitoneal hematoma status post right femoral arterial access,    .         Postoperative Diagnosis: Same As Pre-Op Dx,    .         Performed by:    ANGELA JONES (Surgeon - Primary)  .         Assistant:    NATE YORK (Resident)  .         Procedures Performed:    1.  RIGHT GROIN EXPLORATION  2.  REPAIR OF RIGHT DISTAL EXTERNAL ILIAC ARTERY  .         Findings: See report for details.         Specimens Removed:    .         Estimated Blood Loss: 50  ml 25  ml ,    .         Blood Administered: No.         Complications: None.         Grafts/Implants: None.    Anesthetic utilized:     General  ENID PALACIOS (Supervisor)  .    INDICATION:    The patient is a 89-year-old female who earlier today underwent coronary stent placement via right femoral arterial access.  Per the cardiologist's report there was difficulty with initial wire access due to calcification of the vessel.  The coronary stent procedure was successful and uneventful; however 1-2 hours after the procedure was completed the patient became hypotensive and tachycardic as well as complaining of right lower abdominal and flank pain.  Her hemodynamics improved with administration of blood and crystalloid.  Her hemoglobin was noted to have decreased and therefore a noncontrast CT scan of the abdomen and pelvis was ordered.  This demonstrated a right retroperitoneal hematoma and the vascular service was consulted.  A discussion was had with the patient and family regarding the need for immediate operative exploration of the right groin femoral access site with repair as needed as well as possible angiogram  and stent placement.  Risks and benefits were discussed and the patient and family understood and wished to proceed.    TECHNIQUE:    The patient was emergently taken to the operating room and placed supine on the table.  General endotracheal anesthesia was administered, preoperative IV antibiotics were administered, and a timeout was performed.  The lower abdomen, both groins and the legs down to the knees were prepped and draped in the standard sterile surgical fashion.  The patient had a palpable right femoral pulse and a mild fullness within the right groin.  The scalpel was used to make a longitudinal incision over the palpable right femoral pulse.  Electrocautery was used to dissect through the subcutaneous tissue and expose the distal common femoral artery at its bifurcation.  The artery was exposed cephalad towards the distal external iliac artery.  There was a small amount of fresh hematoma identified surrounding the vessel.  Immediately upon exposure of the distal external iliac artery, brisk bleeding was identified from a site on the anterior wall of this vessel.  It was unclear if this was a previous puncture site or a side branch.  The pulsatile bleeding on the distal external iliac artery was rapidly controlled and oversewn with a 6-0 Prolene suture which provided complete hemostasis.      The remainder of the distal external iliac artery and common femoral artery were then exposed to evaluate for any additional injury.  A fresh puncture site with an intact and hemostatic Proglide closure device was identified on the common femoral artery.  A separate inferior puncture site on the common femoral artery was identified and oversewn with 6-0 Prolene suture to prevent additional bleeding.  The entire wound was then copiously irrigated and additional hemostasis in the soft tissue was achieved with electrocautery.  There was a palpable pulse in the entire arterial exposure of the distal external iliac and  common femoral artery.  No additional bleeding sites were identified.  A 10 flat fully perforated CRISTIAN drain was placed within the wound through a separate stab incision, and anchored to the skin with a 3-0 nylon suture.  Once a final check for hemostasis was complete, the wound was closed in a multilayered fashion using 2-0 Vicryl for deep and soft tissue reapproximation followed by 4-0 Monocryl in a running subcuticular stitch for the skin.  Dermabond, Telfa, and sterile Tegaderm dressings were applied.  The patient was extubated and brought to the surgical intensive care unit in stable condition.  Sponge, needle, and instrument counts were reported as correct at the end of the case.  I was present for the entire procedure..           Electronically Signed On 12/26/2017 23:38  __________________________________________________   ANGELA JONES      Electronically Signed On 12/26/2017 23:38  __________________________________________________   ANGELA JONES     with patient

## 2024-05-21 NOTE — H&P ADULT - NSHPLABSRESULTS_GEN_ALL_CORE
LABS:                        12.6   9.11  )-----------( 222      ( 20 May 2024 23:07 )             39.5     20 May 2024 23:07    137    |  101    |  20     ----------------------------<  106    4.4     |  21     |  0.71     Ca    9.8        20 May 2024 23:07    TPro  x      /  Alb  x      /  TBili  x      /  DBili  1.2    /  AST  x      /  ALT  x      /  AlkPhos  x      21 May 2024 00:40    PT/INR - ( 20 May 2024 23:07 )   PT: 11.7 sec;   INR: 1.04 ratio         PTT - ( 20 May 2024 23:07 )  PTT:33.1 sec  CAPILLARY BLOOD GLUCOSE            LIVER FUNCTIONS - ( 20 May 2024 23:07 )  Alb: 4.0 g/dL / Pro: 7.1 g/dL / ALK PHOS: 85 U/L / ALT: 57 U/L / AST: 64 U/L / GGT: x             Urinalysis Basic - ( 21 May 2024 00:40 )    Color: Yellow / Appearance: Clear / S.017 / pH: x  Gluc: x / Ketone: Trace mg/dL  / Bili: Negative / Urobili: 0.2 mg/dL   Blood: x / Protein: Trace mg/dL / Nitrite: Negative   Leuk Esterase: Negative / RBC: 1 /HPF / WBC 1 /HPF   Sq Epi: x / Non Sq Epi: 0 /HPF / Bacteria: Negative /HPF      IMAGING:    CT Abdomen and Pelvis w/ IV Cont (24 @ 02:15)    FINDINGS:  LOWER CHEST: Within normal limits.    LIVER: Within normal limits.  BILE DUCTS: Intra and extrahepatic biliary ductal dilation. Common bile   duct 1.1 cm diameter. 1 cm diameter increased density structure at the   papilla.  GALLBLADDER: Distended with wall thickening. No gallstones are visible   within the gallbladder.  SPLEEN: Within normal limits.  PANCREAS: Within normal limits.  ADRENALS: Within normal limits.  KIDNEYS/URETERS: No hydronephrosis or renal stone or acute abnormality.   Several bilateral cysts, greater on the left. 2 exophytic lesions off the   lateral midpole left kidney measuring 2.6 and 2.3 cm have indeterminate   density.    BLADDER: Within normal limits.  REPRODUCTIVE ORGANS: No acute abnormality. Fundal endometrial cavity 1 cm   in AP dimension. Normal ovaries.    BOWEL: No bowel obstruction. Appendix is normal.  PERITONEUM: No ascites.  VESSELS: Atherosclerotic changes. No abdominal aortic aneurysm.  RETROPERITONEUM/LYMPH NODES: No lymphadenopathy.  ABDOMINAL WALL: Within normal limits.  BONES: Prominent degenerative changes lumbar spine. No fracture or   aggressive osseous lesions.    IMPRESSION:  Distal common bile duct obstruction secondary to 1 cm diameter increased   density lesion most likely a gallstone. A small enhancing mass could also   appear this way but is less likely. MRI abdomen with and without   gadolinium with MRCP would most definitively image.    Gallbladder distention with wall thickening suspicious for concomitant   acute cholecystitis.    No other acute finding.    Prominence of the fundal endometrial cavity for a postmenopausal patient.   Please correlate for postmenopausal bleeding. Consider nonemergent pelvic   ultrasound.    2 exophytic lesions off the left kidney are probably proteinaceous cysts.   Solid masses less likely. Renal ultrasound would differentiate.

## 2024-05-21 NOTE — H&P ADULT - ATTENDING COMMENTS
Choledocholithiasis with possible acute calculous cholecystitis   -Imaging and clinical status suggestive of acute calculous cholecystitis and CBD obstruction with correlating LFTs  -Preop for laparoscopic cholecystectomy w IOC and possible spyglass  -IV abx, pain control, IV fluid resuscitation      Randolph Lee MD  Acute and Critical Care Surgery    The Acute Care Surgery (B Team) Attending Group Practice:  Dr. Duy Abdul, Dr. Robert Almanza, Dr. Randolph Lee,  Dr. Francisco Martinez and Dr. Annabella Munguia     Urgent issues - spectra [id number] or [id number]  Nonurgent issues - [phone]  Patient appointments or after hours - [phone]

## 2024-05-21 NOTE — PATIENT PROFILE ADULT - FALL HARM RISK - FALLEN IN PAST
Detail Level: Zone Discontinue Regimen: Solodyn 80mg Render In Strict Bullet Format?: No Continue Regimen: Ciclopirox 1% Shampoo 3x wkly\\nBetamethasone valerate Foam- PRN No

## 2024-05-21 NOTE — H&P ADULT - HISTORY OF PRESENT ILLNESS
89F w/ PMHx of HTN and HLD who presents to the ED with a 1 day history of epigastric pain with associated nausea after eating. She has had this happen in the past but has self resolved. No fevers/chills, chest pain/shortness of breath, or dizziness/lightheadedness.    In the ED, patient was afebrile, hemodynamically stable, labs remarkable for total bilirubin of 1.6, otherwise WNL, CTAP revealed distal common bile duct obstruction secondary to 1 cm diameter increased   density lesion most likely a gallstone. Gallbladder distention with wall thickening suspicious for concomitant   acute cholecystitis.     89F w/ PMHx of HTN and HLD who presents to the ED with a 1 day history of epigastric pain with associated nausea after eating. She has had this happen in the past but has self resolved. No fevers/chills, chest pain/shortness of breath, or dizziness/lightheadedness.    In the ED, patient was afebrile, hemodynamically stable, labs remarkable for total bilirubin of 1.6, otherwise WNL, CTAP revealed distal common bile duct obstruction secondary to 1 cm diameter increased density lesion most likely a gallstone. Gallbladder distention with wall thickening suspicious for concomitant acute cholecystitis. Pt denies nausea, vomiting, CP, SOB, distension, constipation, dysuria.

## 2024-05-22 ENCOUNTER — RESULT REVIEW (OUTPATIENT)
Age: 89
End: 2024-05-22

## 2024-05-22 LAB
ALBUMIN SERPL ELPH-MCNC: 3.6 G/DL — SIGNIFICANT CHANGE UP (ref 3.3–5)
ALP SERPL-CCNC: 118 U/L — SIGNIFICANT CHANGE UP (ref 40–120)
ALT FLD-CCNC: 206 U/L — HIGH (ref 4–33)
ANION GAP SERPL CALC-SCNC: 12 MMOL/L — SIGNIFICANT CHANGE UP (ref 7–14)
APTT BLD: 35 SEC — SIGNIFICANT CHANGE UP (ref 24.5–35.6)
AST SERPL-CCNC: 155 U/L — HIGH (ref 4–32)
BILIRUB SERPL-MCNC: 0.8 MG/DL — SIGNIFICANT CHANGE UP (ref 0.2–1.2)
BLD GP AB SCN SERPL QL: NEGATIVE — SIGNIFICANT CHANGE UP
BUN SERPL-MCNC: 17 MG/DL — SIGNIFICANT CHANGE UP (ref 7–23)
CALCIUM SERPL-MCNC: 9.2 MG/DL — SIGNIFICANT CHANGE UP (ref 8.4–10.5)
CHLORIDE SERPL-SCNC: 104 MMOL/L — SIGNIFICANT CHANGE UP (ref 98–107)
CO2 SERPL-SCNC: 23 MMOL/L — SIGNIFICANT CHANGE UP (ref 22–31)
CREAT SERPL-MCNC: 0.86 MG/DL — SIGNIFICANT CHANGE UP (ref 0.5–1.3)
EGFR: 64 ML/MIN/1.73M2 — SIGNIFICANT CHANGE UP
GLUCOSE SERPL-MCNC: 103 MG/DL — HIGH (ref 70–99)
HCT VFR BLD CALC: 34.7 % — SIGNIFICANT CHANGE UP (ref 34.5–45)
HGB BLD-MCNC: 11.6 G/DL — SIGNIFICANT CHANGE UP (ref 11.5–15.5)
INR BLD: 1.16 RATIO — SIGNIFICANT CHANGE UP (ref 0.85–1.18)
MAGNESIUM SERPL-MCNC: 2 MG/DL — SIGNIFICANT CHANGE UP (ref 1.6–2.6)
MCHC RBC-ENTMCNC: 27.2 PG — SIGNIFICANT CHANGE UP (ref 27–34)
MCHC RBC-ENTMCNC: 33.4 GM/DL — SIGNIFICANT CHANGE UP (ref 32–36)
MCV RBC AUTO: 81.5 FL — SIGNIFICANT CHANGE UP (ref 80–100)
NRBC # BLD: 0 /100 WBCS — SIGNIFICANT CHANGE UP (ref 0–0)
NRBC # FLD: 0 K/UL — SIGNIFICANT CHANGE UP (ref 0–0)
PHOSPHATE SERPL-MCNC: 3.5 MG/DL — SIGNIFICANT CHANGE UP (ref 2.5–4.5)
PLATELET # BLD AUTO: 192 K/UL — SIGNIFICANT CHANGE UP (ref 150–400)
POTASSIUM SERPL-MCNC: 3.9 MMOL/L — SIGNIFICANT CHANGE UP (ref 3.5–5.3)
POTASSIUM SERPL-SCNC: 3.9 MMOL/L — SIGNIFICANT CHANGE UP (ref 3.5–5.3)
PROT SERPL-MCNC: 6.3 G/DL — SIGNIFICANT CHANGE UP (ref 6–8.3)
PROTHROM AB SERPL-ACNC: 12.9 SEC — SIGNIFICANT CHANGE UP (ref 9.5–13)
RBC # BLD: 4.26 M/UL — SIGNIFICANT CHANGE UP (ref 3.8–5.2)
RBC # FLD: 13.4 % — SIGNIFICANT CHANGE UP (ref 10.3–14.5)
RH IG SCN BLD-IMP: POSITIVE — SIGNIFICANT CHANGE UP
SODIUM SERPL-SCNC: 139 MMOL/L — SIGNIFICANT CHANGE UP (ref 135–145)
WBC # BLD: 5.83 K/UL — SIGNIFICANT CHANGE UP (ref 3.8–10.5)
WBC # FLD AUTO: 5.83 K/UL — SIGNIFICANT CHANGE UP (ref 3.8–10.5)

## 2024-05-22 PROCEDURE — 47550 BILE DUCT ENDOSCOPY ADD-ON: CPT | Mod: GC

## 2024-05-22 PROCEDURE — 88304 TISSUE EXAM BY PATHOLOGIST: CPT | Mod: 26

## 2024-05-22 PROCEDURE — 47563 LAPARO CHOLECYSTECTOMY/GRAPH: CPT | Mod: GC

## 2024-05-22 DEVICE — CHOLANGIOGRAM CATH BOSTON SCIENTIFIC SPYGLASS DISCOVER T/A: Type: IMPLANTABLE DEVICE | Status: FUNCTIONAL

## 2024-05-22 DEVICE — GUIDEWIRE DISCOVER JAGWIRE 2/BX: Type: IMPLANTABLE DEVICE | Status: FUNCTIONAL

## 2024-05-22 DEVICE — BASKET RETREIVAL SPYGLASS 15MM 286CM: Type: IMPLANTABLE DEVICE | Status: FUNCTIONAL

## 2024-05-22 DEVICE — CLIP APPLIER COVIDIEN ENDOCLIP II 10MM MED/LG: Type: IMPLANTABLE DEVICE | Status: FUNCTIONAL

## 2024-05-22 DEVICE — CATH SPYGLASS DISCOVER IMAGER II IOC: Type: IMPLANTABLE DEVICE | Status: FUNCTIONAL

## 2024-05-22 DEVICE — CATH REDDICK W/TROCAR 4FRX50CM: Type: IMPLANTABLE DEVICE | Status: FUNCTIONAL

## 2024-05-22 DEVICE — SURGICEL POWDER 3 GRAMS: Type: IMPLANTABLE DEVICE | Status: FUNCTIONAL

## 2024-05-22 DEVICE — LIGATING CLIPS WECK HEMOLOK POLYMER LARGE (PURPLE) 6: Type: IMPLANTABLE DEVICE | Status: FUNCTIONAL

## 2024-05-22 RX ORDER — HYDROMORPHONE HYDROCHLORIDE 2 MG/ML
0.5 INJECTION INTRAMUSCULAR; INTRAVENOUS; SUBCUTANEOUS
Refills: 0 | Status: DISCONTINUED | OUTPATIENT
Start: 2024-05-22 | End: 2024-05-22

## 2024-05-22 RX ORDER — ACETAMINOPHEN 500 MG
975 TABLET ORAL EVERY 6 HOURS
Refills: 0 | Status: DISCONTINUED | OUTPATIENT
Start: 2024-05-22 | End: 2024-05-23

## 2024-05-22 RX ORDER — ONDANSETRON 8 MG/1
4 TABLET, FILM COATED ORAL ONCE
Refills: 0 | Status: DISCONTINUED | OUTPATIENT
Start: 2024-05-22 | End: 2024-05-22

## 2024-05-22 RX ORDER — HYDROMORPHONE HYDROCHLORIDE 2 MG/ML
0.25 INJECTION INTRAMUSCULAR; INTRAVENOUS; SUBCUTANEOUS
Refills: 0 | Status: DISCONTINUED | OUTPATIENT
Start: 2024-05-22 | End: 2024-05-22

## 2024-05-22 RX ADMIN — HYDROMORPHONE HYDROCHLORIDE 0.5 MILLIGRAM(S): 2 INJECTION INTRAMUSCULAR; INTRAVENOUS; SUBCUTANEOUS at 15:45

## 2024-05-22 RX ADMIN — ENOXAPARIN SODIUM 40 MILLIGRAM(S): 100 INJECTION SUBCUTANEOUS at 22:36

## 2024-05-22 RX ADMIN — PIPERACILLIN AND TAZOBACTAM 25 GRAM(S): 4; .5 INJECTION, POWDER, LYOPHILIZED, FOR SOLUTION INTRAVENOUS at 14:55

## 2024-05-22 RX ADMIN — PIPERACILLIN AND TAZOBACTAM 25 GRAM(S): 4; .5 INJECTION, POWDER, LYOPHILIZED, FOR SOLUTION INTRAVENOUS at 06:43

## 2024-05-22 RX ADMIN — DEXTROSE MONOHYDRATE, SODIUM CHLORIDE, AND POTASSIUM CHLORIDE 100 MILLILITER(S): 50; .745; 4.5 INJECTION, SOLUTION INTRAVENOUS at 17:01

## 2024-05-22 RX ADMIN — HYDROMORPHONE HYDROCHLORIDE 0.5 MILLIGRAM(S): 2 INJECTION INTRAMUSCULAR; INTRAVENOUS; SUBCUTANEOUS at 15:15

## 2024-05-22 RX ADMIN — DEXTROSE MONOHYDRATE, SODIUM CHLORIDE, AND POTASSIUM CHLORIDE 100 MILLILITER(S): 50; .745; 4.5 INJECTION, SOLUTION INTRAVENOUS at 00:55

## 2024-05-22 RX ADMIN — Medication 975 MILLIGRAM(S): at 22:46

## 2024-05-22 RX ADMIN — Medication 975 MILLIGRAM(S): at 23:46

## 2024-05-22 RX ADMIN — ROSUVASTATIN CALCIUM 10 MILLIGRAM(S): 5 TABLET ORAL at 22:36

## 2024-05-22 RX ADMIN — PIPERACILLIN AND TAZOBACTAM 25 GRAM(S): 4; .5 INJECTION, POWDER, LYOPHILIZED, FOR SOLUTION INTRAVENOUS at 00:56

## 2024-05-22 NOTE — CONSULT NOTE ADULT - SUBJECTIVE AND OBJECTIVE BOX
CARDIOLOGY FELLOW CONSULT NOTE    HPI:    The patient is a 89F (Kinyarwanda speaking) w/ PMH of HTN, MV regurgitation, palpitations and HLD who presented to the ED with epigastric pain for one day, with associated nausea after eating, found to have distal common bile duct obstruction with acute cholecystitis. The pt is now s/p laparoscopic cholecystectomy on 5/22. Intra-op KARISSA performed by anesthesiology due to a murmur indicated severe AS and cardiology was consulted for further management. The patient's son is at the bedside in the PACU and translates for her. The pt lives with her sister and is independent with her ADLs, her son reports that she occasionally complains of brief dizziness and dyspnea when climbing stairs. No chest pain, syncope. She has had 2 falls without loss of consciousness over the past year, where she lost her balance. She follows with Dr. Abel Umanzor for cardiology.             PMHx:   HTN (hypertension)    HLD (hyperlipidemia)          Allergies:  No Known Allergies      Current Medications:   acetaminophen     Tablet .. 650 milliGRAM(s) Oral every 6 hours PRN  amLODIPine   Tablet 5 milliGRAM(s) Oral daily  dextrose 5% + sodium chloride 0.45% with potassium chloride 20 mEq/L 1000 milliLiter(s) IV Continuous <Continuous>  enoxaparin Injectable 40 milliGRAM(s) SubCutaneous every 24 hours  HYDROmorphone  Injectable 0.5 milliGRAM(s) IV Push every 10 minutes PRN  HYDROmorphone  Injectable 0.25 milliGRAM(s) IV Push every 10 minutes PRN  montelukast 10 milliGRAM(s) Oral daily  ondansetron Injectable 4 milliGRAM(s) IV Push once PRN  oxyCODONE    IR 2.5 milliGRAM(s) Oral every 4 hours PRN  piperacillin/tazobactam IVPB.. 3.375 Gram(s) IV Intermittent every 8 hours  rosuvastatin 10 milliGRAM(s) Oral at bedtime      FAMILY HISTORY:      Social History:  Smoking History: Never  Alcohol Use: Never  Drug Use: Never    CURRENT REVIEW OF SYSTEMS:  CONSTITUTIONAL: + weakness in the PACU, no fevers or chills  EYES/ENT: No visual changes;  No dysphagia  NECK: No pain or stiffness  RESPIRATORY: No cough, wheezing, hemoptysis; No shortness of breath  CARDIOVASCULAR: No chest pain or palpitations; No lower extremity edema  GASTROINTESTINAL: No abdominal or epigastric pain. No nausea, vomiting, or hematemesis; No diarrhea or constipation. No melena or hematochezia.  BACK: No back pain  GENITOURINARY: No dysuria, frequency or hematuria  NEUROLOGICAL: No numbness or weakness  SKIN: No itching, burning, rashes, or lesions   All other review of systems is negative unless indicated above.    Physical Exam:  T(F): 97.3 (05-22), Max: 98.9 (05-22)  HR: 62 (05-22) (56 - 67)  BP: 135/62 (05-22) (112/53 - 164/74)  RR: 16 (05-22)  SpO2: 94% (05-22)      Appearance: No acute distress; tired appearing   Eyes: pink conjunctiva  HEENT: AT/NC   Cardiovascular: RRR, S1, S2, distant heart sounds, murmurs difficult to appreciate; no edema; no JVD  Respiratory: Clear to anterior auscultation bilaterally  Gastrointestinal: soft, non-tender, non-distended   Musculoskeletal: No clubbing; no joint deformity   Neurologic: Normal speech, no facial asymmetry  Psychiatry: Somnolent   Skin: No rashes, ecchymoses, or cyanosis of exposed skin                             11.6   5.83  )-----------( 192      ( 22 May 2024 00:59 )             34.7     05-22    139  |  104  |  17  ----------------------------<  103<H>  3.9   |  23  |  0.86    Ca    9.2      22 May 2024 00:59  Phos  3.5     05-22  Mg     2.00     05-22    TPro  6.3  /  Alb  3.6  /  TBili  0.8  /  DBili  x   /  AST  155<H>  /  ALT  206<H>  /  AlkPhos  118  05-22    PT/INR - ( 22 May 2024 00:59 )   PT: 12.9 sec;   INR: 1.16 ratio         PTT - ( 22 May 2024 00:59 )  PTT:35.0 sec    CARDIAC MARKERS ( 21 May 2024 00:40 )  20 ng/L / x     / x     / x     / x     / x      CARDIAC MARKERS ( 20 May 2024 23:07 )  19 ng/L / x     / x     / x     / x     / x

## 2024-05-22 NOTE — BRIEF OPERATIVE NOTE - NSICDXBRIEFPROCEDURE_GEN_ALL_CORE_FT
PROCEDURES:  Laparoscopic cholecystectomy 22-May-2024 19:10:15  Carlos Vitale  Cholangiogram, intraoperative 22-May-2024 19:10:59  Carlos Vitale

## 2024-05-22 NOTE — CONSULT NOTE ADULT - NSCONSULTADDITIONALINFOA_GEN_ALL_CORE
Cardiology Attending Addendum:    Please see my note for the morning of 05/23/2023.  Do not bill.    Luis Kaiser MD [facility] FACP  Cardiology  [phone]

## 2024-05-22 NOTE — BRIEF OPERATIVE NOTE - OPERATION/FINDINGS
4 port laparoscopic cholecystectomy with IOC and Spyglass clearance of 2 CBD stones. Critical view identified.  4 port laparoscopic cholecystectomy, Hue cutdown with noted 1.5cm hernia defect at umbilicus, fascia thinned. IOC confirmed filling defect in distal CBD and as such Spyglass clearance of 2 CBD stones was undertaken. Cystic duct then clipped triply and cut. Gallbladder taken off liver bed with good hemostasis. Fascia closed with 0-Vicryl.

## 2024-05-22 NOTE — CHART NOTE - NSCHARTNOTEFT_GEN_A_CORE
General Surgery Post op Check    Pt seen and examined without complaints. Pain is controlled. Denies SOB/CP/N/V. Family bedside.    Vital Signs Last 24 Hrs  T(C): 36.7 (22 May 2024 17:36), Max: 37.2 (22 May 2024 01:21)  T(F): 98 (22 May 2024 17:36), Max: 98.9 (22 May 2024 01:21)  HR: 70 (22 May 2024 17:36) (56 - 70)  BP: 140/68 (22 May 2024 17:36) (112/53 - 164/74)  BP(mean): 79 (22 May 2024 17:15) (69 - 88)  RR: 16 (22 May 2024 17:36) (12 - 20)  SpO2: 97% (22 May 2024 17:36) (92% - 100%)    Parameters below as of 22 May 2024 17:36  Patient On (Oxygen Delivery Method): room air        I&O's Summary    21 May 2024 07:01  -  22 May 2024 07:00  --------------------------------------------------------  IN: 0 mL / OUT: 600 mL / NET: -600 mL    Physical Exam  Gen: NAD  Pulm: No respiratory distress  CV: RRR  Abd: Soft, appropriately tender, mildly distended, port sites c/d/i      A/P: 89F s/p lap lesa + IOC + spyglass - progressing well.    -CLD, advance as tolerated   -Encouraged OOB  -Strict I&O's  -Analgesia and antiemetics as needed  - f/u TTE and Cards rec

## 2024-05-23 ENCOUNTER — TRANSCRIPTION ENCOUNTER (OUTPATIENT)
Age: 89
End: 2024-05-23

## 2024-05-23 ENCOUNTER — RESULT REVIEW (OUTPATIENT)
Age: 89
End: 2024-05-23

## 2024-05-23 VITALS
OXYGEN SATURATION: 95 % | HEART RATE: 61 BPM | RESPIRATION RATE: 16 BRPM | DIASTOLIC BLOOD PRESSURE: 57 MMHG | SYSTOLIC BLOOD PRESSURE: 131 MMHG | TEMPERATURE: 98 F

## 2024-05-23 LAB
ALBUMIN SERPL ELPH-MCNC: 3.8 G/DL — SIGNIFICANT CHANGE UP (ref 3.3–5)
ALP SERPL-CCNC: 95 U/L — SIGNIFICANT CHANGE UP (ref 40–120)
ALT FLD-CCNC: 127 U/L — HIGH (ref 4–33)
ANION GAP SERPL CALC-SCNC: 14 MMOL/L — SIGNIFICANT CHANGE UP (ref 7–14)
AST SERPL-CCNC: 69 U/L — HIGH (ref 4–32)
BILIRUB DIRECT SERPL-MCNC: <0.2 MG/DL — SIGNIFICANT CHANGE UP (ref 0–0.3)
BILIRUB INDIRECT FLD-MCNC: >0.3 MG/DL — SIGNIFICANT CHANGE UP (ref 0–1)
BILIRUB SERPL-MCNC: 0.5 MG/DL — SIGNIFICANT CHANGE UP (ref 0.2–1.2)
BUN SERPL-MCNC: 10 MG/DL — SIGNIFICANT CHANGE UP (ref 7–23)
CALCIUM SERPL-MCNC: 9 MG/DL — SIGNIFICANT CHANGE UP (ref 8.4–10.5)
CHLORIDE SERPL-SCNC: 102 MMOL/L — SIGNIFICANT CHANGE UP (ref 98–107)
CO2 SERPL-SCNC: 22 MMOL/L — SIGNIFICANT CHANGE UP (ref 22–31)
CREAT SERPL-MCNC: 0.69 MG/DL — SIGNIFICANT CHANGE UP (ref 0.5–1.3)
EGFR: 82 ML/MIN/1.73M2 — SIGNIFICANT CHANGE UP
GLUCOSE SERPL-MCNC: 103 MG/DL — HIGH (ref 70–99)
HCT VFR BLD CALC: 35.2 % — SIGNIFICANT CHANGE UP (ref 34.5–45)
HGB BLD-MCNC: 11.6 G/DL — SIGNIFICANT CHANGE UP (ref 11.5–15.5)
MAGNESIUM SERPL-MCNC: 1.9 MG/DL — SIGNIFICANT CHANGE UP (ref 1.6–2.6)
MCHC RBC-ENTMCNC: 27.6 PG — SIGNIFICANT CHANGE UP (ref 27–34)
MCHC RBC-ENTMCNC: 33 GM/DL — SIGNIFICANT CHANGE UP (ref 32–36)
MCV RBC AUTO: 83.6 FL — SIGNIFICANT CHANGE UP (ref 80–100)
NRBC # BLD: 0 /100 WBCS — SIGNIFICANT CHANGE UP (ref 0–0)
NRBC # FLD: 0 K/UL — SIGNIFICANT CHANGE UP (ref 0–0)
PHOSPHATE SERPL-MCNC: 2.4 MG/DL — LOW (ref 2.5–4.5)
PLATELET # BLD AUTO: 197 K/UL — SIGNIFICANT CHANGE UP (ref 150–400)
POTASSIUM SERPL-MCNC: 3.7 MMOL/L — SIGNIFICANT CHANGE UP (ref 3.5–5.3)
POTASSIUM SERPL-SCNC: 3.7 MMOL/L — SIGNIFICANT CHANGE UP (ref 3.5–5.3)
PROT SERPL-MCNC: 6.2 G/DL — SIGNIFICANT CHANGE UP (ref 6–8.3)
RBC # BLD: 4.21 M/UL — SIGNIFICANT CHANGE UP (ref 3.8–5.2)
RBC # FLD: 13.5 % — SIGNIFICANT CHANGE UP (ref 10.3–14.5)
SODIUM SERPL-SCNC: 138 MMOL/L — SIGNIFICANT CHANGE UP (ref 135–145)
TROPONIN T, HIGH SENSITIVITY RESULT: 28 NG/L — SIGNIFICANT CHANGE UP
WBC # BLD: 9.76 K/UL — SIGNIFICANT CHANGE UP (ref 3.8–10.5)
WBC # FLD AUTO: 9.76 K/UL — SIGNIFICANT CHANGE UP (ref 3.8–10.5)

## 2024-05-23 PROCEDURE — 93010 ELECTROCARDIOGRAM REPORT: CPT

## 2024-05-23 PROCEDURE — 99222 1ST HOSP IP/OBS MODERATE 55: CPT | Mod: GC

## 2024-05-23 PROCEDURE — 71045 X-RAY EXAM CHEST 1 VIEW: CPT | Mod: 26

## 2024-05-23 PROCEDURE — 93306 TTE W/DOPPLER COMPLETE: CPT | Mod: 26

## 2024-05-23 PROCEDURE — 74019 RADEX ABDOMEN 2 VIEWS: CPT | Mod: 26

## 2024-05-23 RX ORDER — SODIUM,POTASSIUM PHOSPHATES 278-250MG
2 POWDER IN PACKET (EA) ORAL ONCE
Refills: 0 | Status: DISCONTINUED | OUTPATIENT
Start: 2024-05-23 | End: 2024-05-23

## 2024-05-23 RX ORDER — SODIUM,POTASSIUM PHOSPHATES 278-250MG
1 POWDER IN PACKET (EA) ORAL ONCE
Refills: 0 | Status: COMPLETED | OUTPATIENT
Start: 2024-05-23 | End: 2024-05-23

## 2024-05-23 RX ORDER — ACETAMINOPHEN 500 MG
3 TABLET ORAL
Qty: 0 | Refills: 0 | DISCHARGE
Start: 2024-05-23

## 2024-05-23 RX ADMIN — Medication 975 MILLIGRAM(S): at 13:58

## 2024-05-23 RX ADMIN — Medication 975 MILLIGRAM(S): at 08:50

## 2024-05-23 RX ADMIN — Medication 975 MILLIGRAM(S): at 13:25

## 2024-05-23 RX ADMIN — AMLODIPINE BESYLATE 5 MILLIGRAM(S): 2.5 TABLET ORAL at 05:56

## 2024-05-23 RX ADMIN — MONTELUKAST 10 MILLIGRAM(S): 4 TABLET, CHEWABLE ORAL at 13:24

## 2024-05-23 RX ADMIN — Medication 975 MILLIGRAM(S): at 05:54

## 2024-05-23 RX ADMIN — Medication 1 PACKET(S): at 13:25

## 2024-05-23 NOTE — DISCHARGE NOTE PROVIDER - CARE PROVIDERS DIRECT ADDRESSES
[email or web address]

## 2024-05-23 NOTE — DISCHARGE NOTE PROVIDER - NSDCQMCOGNITION_NEU_ALL_CORE
Onset: Fell 2 days ago     Location / description: called yesterday-never heard back regarding appointment, fell on 4/22, seen in ED and was advised to call for a follow up appointment with PCP, twisted right ankle and fell on knee; right knee and ankle are swollen  Precipitating Factors: fell, was never provided crutches and ankle was never wrapped  Pain Scale (1-10), 10 highest: 7/10-knee; 5/10 to right ankle  What improves/worsens symptoms: using ice that has helped with the swelling, taking tylenol with no relief  Symptom specific medications: see chart  LMP : No LMP recorded. Patient is postmenopausal.   Are you pregnant or breast feeding: na  Recent visits (last 3-4 weeks) for same reason or recent surgery:  Seen on 4/22/23 in ED outside of [place].  Was to follow up with PCP within 2-3 days    PLAN:    See Provider within 24 hours.  Unable to schedule with PCP or PCP at [facility] sites.  Referred to [facility]    Patient/Caller agrees to follow recommendations.    Reason for Disposition  • [1] High-risk adult (e.g., age > 60 years, osteoporosis, chronic steroid use) AND [2] limping    Protocols used: KNEE INJURY-A-AH       No difficulties

## 2024-05-23 NOTE — CONSULT NOTE ADULT - ASSESSMENT
89-year-old woman with hypertension and dyslipidemia who presented with nausea and post-prandial epigastric pain, now s/p laparoscopic cholecystectomy incidentally noted to have aortic stenosis during intraoperative transesophageal echocardiography.     The patient’s echocardiography last year demonstrated a calcified aortic valve without aortic stenosis. We are unable to review the intra-operative echocardiography images. The patient does note have a murmur that is concerning for significant aortic stenosis that I can appreciate. She does have evidence of LVH on ECG, and having aortic stenosis cannot be excluded.    We will try to facilitate transthoracic echocardiography prior to discharge for clarification. No additional testing will be required. The patient tolerated her procedure well, despite the possibility of underlying severe valve disease. If confirmed, further management of aortic stenosis can appropriately occur in the outpatient setting. I communicated these concerns to her outpatient cardiologist, Dr. Umanzor, who will provide follow-up care.    Luis Kaiser MD [facility] FACP  Cardiology  [phone]  
The patient is a 89F (Burkinan speaking) w/ PMH of HTN, MV regurgitation, palpitations and HLD presenting with epigastric pain, found to have acute cholecystitis. The pt is now s/p laparoscopic cholecystectomy on 5/22. Intra-op KARISSA performed by anesthesiology due to a murmur indicated severe AS and cardiology was consulted for further management.     # Acute cholecystitis   # Aortic stenosis   # Mitral regurgitation       Impression:   - EKG: Sinus rhythm with 1st degree AV block, LAD  - Tele: Sinus, HR in the 60s  - Patient currently without chest pain, dyspnea, light-headedness  - Outpatient TTE Feb 2023: LVEF 60-65%, mild LVH, mild AR, moderate MR, mild ND. Aortic Valve: The aortic valve is tricuspid with normal structure without stenosis. There is mild calcification of the aortic valve leaflets. There is mild thickening of the aortic valve leaflets. The peak transaortic velocity is 2.00 m/s, peak transaortic gradient is 16.1 mmHg and mean transaortic gradient is 7.7 mmHg with an LVOT/aortic valve VTI ratio of 0.71. The aortic valve area is estimated at 2.03 cm² by the continuity equation. There is mild aortic regurgitation. AI pressure half time is 681 msec. AI slope is 2.02 m/s². Aortic valve acceleration time is 73 msec.  - It is unlikely that the patient's aortic stenosis has progressed from mild to severe in the course of one year. In addition, the patient is able to perform her ADLs independently per the son without significant limitations in her ability to function. Although she does report some dyspnea and dizziness on occasion with climbing stairs, she denies any syncope or significant exertional symptoms.     Recommendations:   - Continue telemetry monitoring  - Monitor for hemodynamic instability   - Ensure adequate hydration   - May repeat TTE while admitted       Natalie Bowling MD  Cardiology Fellow     Recommendations are preliminary until cosigned by attending     Please refer to [email or web address] password: [email or web address]" for updated cardiology service schedule and contact information.

## 2024-05-23 NOTE — DISCHARGE NOTE PROVIDER - NSDCFUADDAPPT_GEN_ALL_CORE_FT
Please follow up with your primary care provider 1-2 weeks after discharge regarding recent hospitalization.  Please follow up with your cardiologist within a week.

## 2024-05-23 NOTE — DISCHARGE NOTE PROVIDER - HOSPITAL COURSE
89F (Uzbek speaking) w/ PMH of HTN, MV regurgitation, palpitations and HLD presenting with epigastric pain, found to have acute cholecystitis. Now s/p laparoscopic cholecystectomy with IOC and spyglass. Patient found to have aortic stenosis intraoperatively, cardiology consulted and TTE ordered. Patient tolerated operation well and there were no post-operative complications identified. Patient remained hemodynamically stable in the PACU and transferred to the surgical floor. Diet was restarted and advanced as tolerated. Pain control was transitioned from IV to PO pain meds. At this time, patient is currently ambulating, voiding, tolerating a regular diet. Patient has been deemed stable for discharge home with follow up as an outpatient. 89F (Mohawk speaking) w/ PMH of HTN, MV regurgitation, palpitations and HLD presenting with epigastric pain, found to have acute cholecystitis. Patient underwent laparoscopic cholecystectomy with IOC and spyglass with Dr. Lee on 5/22/24. Patient found to have aortic stenosis intraoperatively, cardiology consulted and TTE ordered. Patient tolerated operation well and there were no post-operative complications identified. Patient remained hemodynamically stable in the PACU and transferred to the surgical floor. Diet was restarted and advanced as tolerated. Pain control was transitioned from IV to PO pain meds. Post-operatively, she reports chest pressure. EKG, cxr are stable. Troponin was normal. Cardiology cleared for discharge. At this time, patient is currently ambulating, voiding, tolerating a regular diet. Patient has been deemed stable for discharge home with follow up as an outpatient. 89F (Malay speaking) w/ PMH of HTN, MV regurgitation, palpitations and HLD presenting with epigastric pain, found to have acute cholecystitis. Patient underwent laparoscopic cholecystectomy with IOC and spyglass with Dr. Lee on 5/22/24. Patient found to have aortic stenosis intraoperatively, cardiology consulted and TTE ordered. Patient tolerated operation well and there were no post-operative complications identified. Patient remained hemodynamically stable in the PACU and transferred to the surgical floor. Diet was restarted and advanced as tolerated. Pain control was transitioned from IV to PO pain meds. Post-operatively, she reports chest pressure. EKG, cxr are stable. Troponin was normal. TTE LVEF 62%, no aortic valve stenosis. Cardiology cleared for discharge. At this time, patient is currently ambulating, voiding, tolerating a regular diet. Patient has been deemed stable for discharge home with follow up as an outpatient.

## 2024-05-23 NOTE — DISCHARGE NOTE PROVIDER - NSDCMRMEDTOKEN_GEN_ALL_CORE_FT
amLODIPine 5 mg oral tablet: 1 tab(s) orally once a day  losartan 100 mg oral tablet: 1 tab(s) orally once a day  montelukast 10 mg oral tablet: 1 tab(s) orally once a day  Potassium Chloride (Eqv-Klor-Con 10) 10 mEq oral tablet, extended release: 1 tab(s) orally once a day  propranolol 120 mg oral capsule, extended release: 1 cap(s) orally once a day  rosuvastatin 10 mg oral tablet: 1 tab(s) orally once a day   acetaminophen 325 mg oral tablet: 3 tab(s) orally every 6 hours as needed for mild to mod pain  amLODIPine 5 mg oral tablet: 1 tab(s) orally once a day  losartan 100 mg oral tablet: 1 tab(s) orally once a day  montelukast 10 mg oral tablet: 1 tab(s) orally once a day  propranolol 120 mg oral capsule, extended release: 1 cap(s) orally once a day  rosuvastatin 10 mg oral tablet: 1 tab(s) orally once a day

## 2024-05-23 NOTE — CONSULT NOTE ADULT - SUBJECTIVE AND OBJECTIVE BOX
The patient was seen and examined with the Cardiology Consultation Teaching Service.   [employer]  #[id number]    The patient is a 89-year-old woman with hypertension and dyslipidemia who presented with nausea and post-prandial epigastric pain, found to have elevated bilirubin and evidence of common bile duct obstruction related to gallstones. The patient underwent laparoscopic cholecystectomy. We are called for concerns regarding severe aortic stenosis incidentally noted during intraoperative transesophageal echocardiography.     The patient had no complaints this morning.    No chest pain  No dyspnea, orthopnea or PND  No palpitations or recent syncope    The patient reports developing some dyspnea and lightheadedness when walking up stairs.   Otherwise, she does not report additional exertional symptoms.    PMH/PSH:  Hypertension  Dyslipidemia    Comfortable-appearing woman in no acute distress  Alert and oriented  Afebrile  Vital signs stable  No carotid bruits  JVP is not elevated  Clear lungs  No murmur of aortic stenosis is heard  Soft, non-tender, non-distended abdomen  Extremities are warm and perfused  No peripheral edema     Normal blood counts  Normal coagulation studies  GFR 64  Transaminase elevation 155/206  Normal bilirubin    ECG demonstrates sinus rhythm with first degree AV delay, left axis deviation, and LVH    CXR demonstrates clear lungs    Echocardiography in 3/2023 demonstrated normal LV systolic function, normal LA size, mild calcification of the aortic valve without stenosis, mild AI, moderate MR, mild MR. Estimated pulmonary pressures were elevated to 41 mmHg.

## 2024-05-23 NOTE — PROGRESS NOTE ADULT - SUBJECTIVE AND OBJECTIVE BOX
B Team Surgery Daily Progress Note  =====================================================    INTERVAL EVENTS: NAEO    SUBJECTIVE: Patient reports that they're feeling well, only some pain in the RUQ overnight. Denies fever, chills, N/V.    --------------------------------------------------------------------------------------  VITAL SIGNS:  T(C): 36.8 (05-22-24 @ 05:10), Max: 36.8 (05-21-24 @ 13:39)  HR: 57 (05-22-24 @ 05:10) (57 - 66)  BP: 112/53 (05-22-24 @ 05:10) (112/53 - 149/76)  RR: 18 (05-22-24 @ 05:10) (17 - 18)  SpO2: 97% (05-22-24 @ 05:10) (97% - 100%)  --------------------------------------------------------------------------------------    EXAM    General: NAD, resting in bed comfortably, ambulating to bathroom well  Cardiac: Regular rate, warm and well perfused  Respiratory: Nonlabored respirations, normal cw expansion, on RA  Abdomen: Soft, nondistended, nontender to palpation  Extremities: Warm, well perfused    --------------------------------------------------------------------------------------    
SUBJECTIVE: Patient seen and examined on AM rounds. Patient denies any complaints. Tolerating reg diet without nausea or vomiting. + Flatus. - BM. Voiding appropriately. Ambulating well. Denies fevers, chills, SOB, CP.      Vital Signs Last 24 Hrs  T(C): 36.7 (23 May 2024 09:29), Max: 37 (22 May 2024 14:25)  T(F): 98.1 (23 May 2024 09:29), Max: 98.6 (22 May 2024 14:25)  HR: 61 (23 May 2024 09:29) (56 - 70)  BP: 133/67 (23 May 2024 09:29) (115/49 - 140/68)  BP(mean): 79 (22 May 2024 17:15) (69 - 88)  RR: 18 (23 May 2024 09:29) (12 - 20)  SpO2: 97% (23 May 2024 09:29) (92% - 97%)    Parameters below as of 23 May 2024 09:29  Patient On (Oxygen Delivery Method): room air        I&O's Detail    22 May 2024 07:01  -  23 May 2024 07:00  --------------------------------------------------------  IN:  Total IN: 0 mL    OUT:    Voided (mL): 400 mL  Total OUT: 400 mL    Total NET: -400 mL          Physical Exam:  Gen: NAD  Pulm: No respiratory distress  CV: RRR  Abd: Soft, appropriately tender, mildly distended, port sites c/d/i      LABS:                        11.6   9.76  )-----------( 197      ( 23 May 2024 05:54 )             35.2     05-23    138  |  102  |  10  ----------------------------<  103<H>  3.7   |  22  |  0.69    Ca    9.0      23 May 2024 05:54  Phos  2.4     05-23  Mg     1.90     05-23    TPro  6.2  /  Alb  3.8  /  TBili  0.5  /  DBili  <0.2  /  AST  69<H>  /  ALT  127<H>  /  AlkPhos  95  05-23    PT/INR - ( 22 May 2024 00:59 )   PT: 12.9 sec;   INR: 1.16 ratio         PTT - ( 22 May 2024 00:59 )  PTT:35.0 sec  Urinalysis Basic - ( 23 May 2024 05:54 )    Color: x / Appearance: x / SG: x / pH: x  Gluc: 103 mg/dL / Ketone: x  / Bili: x / Urobili: x   Blood: x / Protein: x / Nitrite: x   Leuk Esterase: x / RBC: x / WBC x   Sq Epi: x / Non Sq Epi: x / Bacteria: x        RADIOLOGY & ADDITIONAL STUDIES:

## 2024-05-23 NOTE — DISCHARGE NOTE PROVIDER - NSDCFUADDINST_GEN_ALL_CORE_FT
WOUND CARE:  Please keep incisions clean and dry. Please do not Scrub or rub incisions. Do not use lotion or powder on incisions.   BATHING: You may shower and/or sponge bathe. You may use warm soapy water in the shower and rinse, pat dry.  ACTIVITY: No heavy lifting or straining. Otherwise, you may return to your usual level of physical activity. If you are taking narcotic pain medication DO NOT drive a car, operate machinery or make important decisions.  DIET: Return to your usual diet.  NOTIFY YOUR SURGEON IF YOU HAVE: any bleeding that does not stop, any pus draining from your wound(s), any fever (over 100.4 F) persistent nausea/vomiting, or if your pain is not controlled on your discharge pain medications, unable to urinate.  Please follow up with your primary care physician in one week regarding your hospitalization, bring copies of your discharge paperwork.  Please follow up with your surgeon, Dr. Lee. Please call [phone] to make an appointment 2-3 weeks after discharge from the hospital.

## 2024-05-23 NOTE — DISCHARGE NOTE PROVIDER - CARE PROVIDER_API CALL
Randolph Lee  Surgical Critical Care  [id number] [place] [address], Department of Surgery 2nd [place] [address]  Phone: [phone]  Fax: [phone]  Follow Up Time: 2 weeks

## 2024-05-23 NOTE — DISCHARGE NOTE PROVIDER - NSDCCPTREATMENT_GEN_ALL_CORE_FT
PRINCIPAL PROCEDURE  Procedure: Laparoscopic cholecystectomy  Findings and Treatment:       SECONDARY PROCEDURE  Procedure: Cholangiogram, intraoperative  Findings and Treatment:

## 2024-05-23 NOTE — DISCHARGE NOTE NURSING/CASE MANAGEMENT/SOCIAL WORK - NSDCPECAREGIVERED_GEN_ALL_CORE
Medline and carenotes for surgical procedure Lap chol, incision care, as well as DC Medications and side effects literature for patient reference.

## 2024-05-23 NOTE — PROGRESS NOTE ADULT - ASSESSMENT
89F PMHx of HTN and HLD presenting with clinical acute cholecystitis and possible choledocholithiasis. Now s/p 5/22 laparoscopic cholecystectomy with IOC. Recovering well.     Plan:   - reg diet  - pain control  - dvt ppx  - oob as tolerating  - f/u with cardiology  - possible d/c today    B Team  [id number]  
89F presenting with clinical acute cholecystitis and possible choledocholithiasis, with LFTs uptrending on AM labs.     PLAN:  - OR today for laparoscopic cholecystectomy, possible IOC, possible spyglass choledochoscope  - Consent signed and in chart (Citizen of [place])  - NPO @ [facility]  - Pre-op labs  - Pain control as needed  - VTE ppx    B Team Surgery  [id number]

## 2024-05-23 NOTE — DISCHARGE NOTE NURSING/CASE MANAGEMENT/SOCIAL WORK - PATIENT PORTAL LINK FT
You can access the FollowMyHealth Patient Portal offered by [facility] by registering at the following website: [email or web address]. By joining [facility]’s FollowMyHealth portal, you will also be able to view your health information using other applications (apps) compatible with our system.

## 2024-05-28 ENCOUNTER — NON-APPOINTMENT (OUTPATIENT)
Age: 89
End: 2024-05-28

## 2024-05-28 ENCOUNTER — APPOINTMENT (OUTPATIENT)
Dept: CARDIOLOGY | Facility: CLINIC | Age: 89
End: 2024-05-28
Payer: MEDICARE

## 2024-05-28 VITALS
TEMPERATURE: 98.5 F | RESPIRATION RATE: 16 BRPM | OXYGEN SATURATION: 94 % | DIASTOLIC BLOOD PRESSURE: 78 MMHG | BODY MASS INDEX: 26.31 KG/M2 | SYSTOLIC BLOOD PRESSURE: 116 MMHG | HEIGHT: 60 IN | HEART RATE: 57 BPM | WEIGHT: 134 LBS

## 2024-05-28 DIAGNOSIS — I35.1 NONRHEUMATIC AORTIC (VALVE) INSUFFICIENCY: ICD-10-CM

## 2024-05-28 DIAGNOSIS — I10 ESSENTIAL (PRIMARY) HYPERTENSION: ICD-10-CM

## 2024-05-28 DIAGNOSIS — I07.1 RHEUMATIC TRICUSPID INSUFFICIENCY: ICD-10-CM

## 2024-05-28 DIAGNOSIS — Z90.49 ACQUIRED ABSENCE OF OTHER SPECIFIED PARTS OF DIGESTIVE TRACT: ICD-10-CM

## 2024-05-28 DIAGNOSIS — E78.5 HYPERLIPIDEMIA, UNSPECIFIED: ICD-10-CM

## 2024-05-28 DIAGNOSIS — I34.0 NONRHEUMATIC MITRAL (VALVE) INSUFFICIENCY: ICD-10-CM

## 2024-05-28 DIAGNOSIS — M25.471 EFFUSION, RIGHT ANKLE: ICD-10-CM

## 2024-05-28 DIAGNOSIS — M25.472 EFFUSION, RIGHT ANKLE: ICD-10-CM

## 2024-05-28 DIAGNOSIS — I35.8 OTHER NONRHEUMATIC AORTIC VALVE DISORDERS: ICD-10-CM

## 2024-05-28 PROCEDURE — 93000 ELECTROCARDIOGRAM COMPLETE: CPT

## 2024-05-28 PROCEDURE — 99214 OFFICE O/P EST MOD 30 MIN: CPT

## 2024-05-28 PROCEDURE — G2211 COMPLEX E/M VISIT ADD ON: CPT

## 2024-05-29 LAB — SURGICAL PATHOLOGY STUDY: SIGNIFICANT CHANGE UP

## 2024-05-29 NOTE — PHYSICAL EXAM
[Well Developed] : well developed [Well Nourished] : well nourished [No Acute Distress] : no acute distress [Normal Venous Pressure] : normal venous pressure [No Carotid Bruit] : no carotid bruit [Normal S1, S2] : normal S1, S2 [No Murmur] : no murmur [No Rub] : no rub [I] : a grade 1 [___ +] : bilateral [unfilled]U+ pitting edema to the ankles [Clear Lung Fields] : clear lung fields [Good Air Entry] : good air entry [No Respiratory Distress] : no respiratory distress  [Soft] : abdomen soft [Non Tender] : non-tender [No Masses/organomegaly] : no masses/organomegaly [Normal Bowel Sounds] : normal bowel sounds [Normal Gait] : normal gait [No Edema] : no edema [No Cyanosis] : no cyanosis [No Clubbing] : no clubbing [No Varicosities] : no varicosities [No Rash] : no rash [No Skin Lesions] : no skin lesions [Moves all extremities] : moves all extremities [No Focal Deficits] : no focal deficits [Normal Speech] : normal speech [Alert and Oriented] : alert and oriented [Normal memory] : normal memory [General Appearance - Well Developed] : well developed [Normal Appearance] : normal appearance [General Appearance - Well Nourished] : well nourished [Normal Conjunctiva] : the conjunctiva exhibited no abnormalities [Normal Oral Mucosa] : normal oral mucosa [No Oral Pallor] : no oral pallor [Normal Oropharynx] : normal oropharynx [Normal Jugular Venous V Waves Present] : normal jugular venous V waves present [Respiration, Rhythm And Depth] : normal respiratory rhythm and effort [Exaggerated Use Of Accessory Muscles For Inspiration] : no accessory muscle use [Auscultation Breath Sounds / Voice Sounds] : lungs were clear to auscultation bilaterally [Bowel Sounds] : normal bowel sounds [Abdomen Soft] : soft [Abdomen Tenderness] : non-tender [Abnormal Walk] : normal gait [Nail Clubbing] : no clubbing of the fingernails [Cyanosis, Localized] : no localized cyanosis [Petechial Hemorrhages (___cm)] : no petechial hemorrhages [Skin Turgor] : normal skin turgor [Skin Color & Pigmentation] : normal skin color and pigmentation [No Venous Stasis] : no venous stasis [] : no rash [Oriented To Time, Place, And Person] : oriented to person, place, and time [Impaired Insight] : insight and judgment were intact [No Anxiety] : not feeling anxious [5th Left ICS - MCL] : palpated at the 5th LICS in the midclavicular line [Normal] : normal [No Precordial Heave] : no precordial heave was noted [Normal Rate] : normal [Rhythm Regular] : regular [Normal S1] : normal S1 [Normal S2] : normal S2 [No Gallop] : no gallop heard [II] : a grade 2 [2+] : left 2+ [No Abnormalities] : the abdominal aorta was not enlarged and no bruit was heard [No Pitting Edema] : no pitting edema present [S3] : no S3 [S4] : no S4

## 2024-05-29 NOTE — ASSESSMENT
[FreeTextEntry1] : This is a 89 year year old female here today for follow up cardiac evaluation.  She has a past medical history significant for hypertension, mitral valve regurgitation, occasional palpitations and occasional ankle edema.  CHIEF COMPLAINT: Today she is feeling generally well and does not have any complaints at this time.    She is currently prescribed amlodipine 5 mg daily, losartan 100 mg daily, metoprolol succinate 50 mg 1 tablet daily, potassium chloride 10 mEq 1 tablet daily and rosuvastatin 10 mg 1 tablet daily.  However she states that she is no longer on metoprolol and is now on propanolol 120 mg daily which was changed by her primary care doctor.  PMH: She is here with her son to help translate and he states that her primary care doctor took her off propanolol and placed her on metoprolol succinate because propanolol has a side effect of depression.  The patient's son says that she is never depressed and has been on propanolol for "for years.  He states that she was taking her blood pressure at home from her home electronic cuff and were getting elevated readings and is now back on propanolol herself.  He is concerned because she feels that her blood pressure is elevated and she has not been seen in our office since 2020.  She denies fever, chills, weight loss, malaise, rash, alteration bowel habits, weakness, abdominal  pain, bloating, changes in urination, visual disturbances, chest pain, headaches, dizziness, heart palpitations, recent episodes of syncope or falls at this time.  She was born in [place] and has no history of rheumatic fever.  She does not drink excessive caffeine or alcohol.   BLOOD PRESSURE: -BP is well controlled in today's visit.    -I have discussed the importance of maintaining good BP control and reviewed the newest guidelines with the patient while re-enforcing dietary sodium restrictions to no more than 2-3 g daily, DASH diet, life style modifications as well as the goal of maintaining ideal body weight with the patient today. I have advised the patient to avoid the use of over-the-counter medications/ supplements especially NSAIDS.  I have reviewed with MsTejinder CHRISTINE that serious health consequences can occur when blood pressure is not well controlled and the need for strict compliance with medication and that optimal control can significantly reduce the risk of cardiovascular disease stroke, heart attack and other organ damage. They verbally expressed understanding of the fore mentioned serious health consequences to me today.  BLOOD WORK: -New blood work was done 12/9/2022 which demonstrated values WDL A1c 5.8%.  CHOLESTEROL CONTROL: -Patient will continue the advised  TLC diet and to continue follow-up for treatment of hyperlipidemia and repeat blood testing with diet and exercise. I have discussed different exercises and the importance of maintenance of optimal body weight. The importance of staying within guidelines and recommendations was stressed to the patient today and they acknowledged that they understand this to me verbally.   -Ms. KING was educated and advised that failure to follow-up with my medical recommendations to lower cholesterol can result in severe health consequences therefore, they will continuing a low saturated and low fat diet and to avoid excessive carbohydrates to help reduce triglycerides and that lowering LDL levels is associated with a significant decrease in serious cardiac events including but not limited to heart attack stroke and overall death. We will continue lipid lowering agents as advised based on blood test results and the patient understands to call if they develop severe muscle discomfort or if they have a reddish tinted discoloration in their urine.  EDEMA: Patient to advised to follow-up if any redness or signs of infection develop on their legs. Patient had an educational review of the treatment of leg edema. Patient was advised to keep their legs elevated and take medication prescribed today and avoid sodium containing foods. The patient was advised to call prescribe medications and to notify the office of his condition worsens or they developed shortness of breath.  TESTING/REPORTS: -Echocardiogram done in the office 2/27/2023 and results are pending.  -EKG done Dec 16, 2022 which demonstrated regular sinus rhythm with nonspecific ST-T wave changes, BPM of  56 degree AV block.  This is her baseline rhythm.  -The patient had a nuclear stress test February 19, 2020 which demonstrated fixed defects without evidence for ischemia.    -She also had a Holter monitor done which revealed no significant arrhythmia or heart block.    -She has normal left ventricular function.    Electrocardiogram done January 29, 2020 demonstrated normal sinus rhythm rate 60 beats per minutes otherwise markable for left ventricular hypertrophy, left axis deviation, and T-wave inversion in V1 and V2.  PLAN: -She will continue with her current medications and will contact the office if she is having any complaints between now and their next follow up appointment.  I have discussed the plan of care with Ms. BRITTNEY KING  and she  will follow up in 3 months. She is compliant with all of her medications.  The patient understands that aerobic exercises must be increased to minutes 4 times/week and a detailed discussion of lifestyle modification was done today.  The patient has a good understanding of the diagnosis, treatment plan and lifestyle modification.  She will contact me at the office for any questions with their care or any changes in their health status.   Shantel MOE

## 2024-05-29 NOTE — REASON FOR VISIT
[CV Risk Factors and Non-Cardiac Disease] : CV risk factors and non-cardiac disease [Follow-Up - Clinic] : a clinic follow-up of [Hyperlipidemia] : hyperlipidemia [Hypertension] : hypertension [Mitral Regurgitation] : mitral regurgitation [Palpitations] : palpitations [Family Member] : family member [FreeTextEntry1] : enlarged aorta found on past cxr

## 2024-05-29 NOTE — DISCUSSION/SUMMARY
[FreeTextEntry1] : This is an 89-year-old female past medical history significant for hypertension, mitral valve regurgitation, occasional palpitations and occasional ankle edema, who comes in for cardiac consultation. She denies chest pain, dizziness, or syncope.  The patient had a cholecystectomy May 21, 2024 for acute cholecystitis at [facility]. The anesthesiologist was concerned about possible significant aortic stenosis. Echo Doppler examination done May 23 at [facility] demonstrated mild aortic valve regurgitation, aortic valve sclerosis, mild mitral valve regurgitation, mild pulmonic valve regurgitation, trace tricuspid valve regurgitation satisfactory left ventricular function with ejection fraction 62%. Electrocardiogram done May 28, 2024 demonstrated sinus bradycardia rate of 57 bpm is otherwise remarkable for left ventricular hypertrophy, first-degree atrioventricular block. The patient had a nuclear stress test February 19, 2020 which demonstrated fixed defects without evidence for ischemia.    Electrocardiogram done January 29, 2020 demonstrated normal sinus rhythm rate 60 beats per minutes otherwise markable for left ventricular hypertrophy, left axis deviation, and T-wave inversion in V1 and V2.  The patient is currently hemodynamically stable and asymptomatic from a cardiac standpoint.  She will follow-up with me in 4 to 6 weeks.  Blood work can be done at that time to repeat her lipid panel. She will also schedule an echo Doppler examination to evaluate her aortic valve, left ventricular function, chamber size and rule out hypertrophy. She is instructed follow-up with her general surgeon as well as her primary care physician.

## 2024-06-14 ENCOUNTER — APPOINTMENT (OUTPATIENT)
Dept: TRAUMA SURGERY | Facility: HOSPITAL | Age: 89
End: 2024-06-14

## 2024-06-14 ENCOUNTER — NON-APPOINTMENT (OUTPATIENT)
Age: 89
End: 2024-06-14

## 2024-06-14 VITALS
HEIGHT: 60 IN | HEART RATE: 56 BPM | TEMPERATURE: 97.6 F | DIASTOLIC BLOOD PRESSURE: 85 MMHG | WEIGHT: 134 LBS | BODY MASS INDEX: 26.31 KG/M2 | SYSTOLIC BLOOD PRESSURE: 124 MMHG

## 2024-06-14 PROBLEM — Z00.00 ENCOUNTER FOR PREVENTIVE HEALTH EXAMINATION: Status: ACTIVE | Noted: 2024-06-14

## 2024-06-14 NOTE — HISTORY OF PRESENT ILLNESS
[de-identified] : Mrs. Saucedo is s/p lap lesa with spyglass clearance of the CBD. She has recovered well and denies fevers, chills, abdominal pain, SOB/dyspnea or weakness/fatigue. Tolerating adequate PO intake and GI activity (bowel movements) has recovered without constipation or diarrhea. She noticed some indigestion with a veal cutlet.  No complaints regarding incisions and dressings.

## 2024-06-14 NOTE — PHYSICAL EXAM
[de-identified] : Well ,comfortable. [de-identified] : Soft, nontender, nondistended. The incisions are well healed without signs of erythema, cellulitis or drainage. No palpable hernia formation.

## 2024-06-14 NOTE — PLAN
[FreeTextEntry1] : Mrs. Saucedo underwent a laparoscopic cholecystectomy and has recovered well. The incisions are well healed and good PO nutrition is being tolerated. The pathology revealed no evidence of malignancy.   Resume normal activity with gradual resumption of strenuous activity Avoid fatty foods. Follow up with me in clinic if yellowing of skin develops or fever/chills and RUQ pain develops.   I spent 15min reviewing data, images and information. Greater than 50% of my time was spent in face to face discussion regarding wound healing, postoperative diet and activity.  Randolph Lee MD Acute Care Surgery

## 2024-06-17 RX ORDER — ROSUVASTATIN CALCIUM 5 MG/1
1 TABLET ORAL
Refills: 0 | DISCHARGE

## 2024-06-17 RX ORDER — LOSARTAN POTASSIUM 100 MG/1
1 TABLET, FILM COATED ORAL
Refills: 0 | DISCHARGE

## 2024-06-17 RX ORDER — POTASSIUM CHLORIDE 20 MEQ
1 PACKET (EA) ORAL
Refills: 0 | DISCHARGE

## 2024-06-17 RX ORDER — AMLODIPINE BESYLATE 2.5 MG/1
1 TABLET ORAL
Refills: 0 | DISCHARGE

## 2024-06-17 RX ORDER — MONTELUKAST 4 MG/1
1 TABLET, CHEWABLE ORAL
Refills: 0 | DISCHARGE

## 2024-06-17 RX ORDER — PROPRANOLOL HCL 160 MG
1 CAPSULE, EXTENDED RELEASE 24HR ORAL
Refills: 0 | DISCHARGE

## 2024-06-21 ENCOUNTER — APPOINTMENT (OUTPATIENT)
Dept: CARDIOLOGY | Facility: CLINIC | Age: 89
End: 2024-06-21

## 2025-02-05 ENCOUNTER — LABORATORY RESULT (OUTPATIENT)
Age: 89
End: 2025-02-05

## 2025-02-05 ENCOUNTER — NON-APPOINTMENT (OUTPATIENT)
Age: 89
End: 2025-02-05

## 2025-02-05 ENCOUNTER — APPOINTMENT (OUTPATIENT)
Dept: CARDIOLOGY | Facility: CLINIC | Age: 89
End: 2025-02-05
Payer: MEDICARE

## 2025-02-05 VITALS
SYSTOLIC BLOOD PRESSURE: 131 MMHG | TEMPERATURE: 97.9 F | RESPIRATION RATE: 16 BRPM | HEART RATE: 58 BPM | WEIGHT: 134 LBS | BODY MASS INDEX: 26.17 KG/M2 | OXYGEN SATURATION: 95 % | DIASTOLIC BLOOD PRESSURE: 83 MMHG

## 2025-02-05 DIAGNOSIS — I51.7 CARDIOMEGALY: ICD-10-CM

## 2025-02-05 DIAGNOSIS — E78.5 HYPERLIPIDEMIA, UNSPECIFIED: ICD-10-CM

## 2025-02-05 DIAGNOSIS — I07.1 RHEUMATIC TRICUSPID INSUFFICIENCY: ICD-10-CM

## 2025-02-05 DIAGNOSIS — I35.1 NONRHEUMATIC AORTIC (VALVE) INSUFFICIENCY: ICD-10-CM

## 2025-02-05 DIAGNOSIS — I10 ESSENTIAL (PRIMARY) HYPERTENSION: ICD-10-CM

## 2025-02-05 DIAGNOSIS — I34.0 NONRHEUMATIC MITRAL (VALVE) INSUFFICIENCY: ICD-10-CM

## 2025-02-05 DIAGNOSIS — I35.8 OTHER NONRHEUMATIC AORTIC VALVE DISORDERS: ICD-10-CM

## 2025-02-05 PROCEDURE — G2211 COMPLEX E/M VISIT ADD ON: CPT

## 2025-02-05 PROCEDURE — 99214 OFFICE O/P EST MOD 30 MIN: CPT

## 2025-02-05 PROCEDURE — 93000 ELECTROCARDIOGRAM COMPLETE: CPT

## 2025-05-09 DIAGNOSIS — I07.1 RHEUMATIC TRICUSPID INSUFFICIENCY: ICD-10-CM

## 2025-05-09 DIAGNOSIS — I34.0 NONRHEUMATIC MITRAL (VALVE) INSUFFICIENCY: ICD-10-CM

## 2025-05-09 DIAGNOSIS — I35.1 NONRHEUMATIC AORTIC (VALVE) INSUFFICIENCY: ICD-10-CM

## 2025-06-13 ENCOUNTER — APPOINTMENT (OUTPATIENT)
Dept: CARDIOLOGY | Facility: CLINIC | Age: 89
End: 2025-06-13
Payer: MEDICARE

## 2025-06-13 PROCEDURE — 93306 TTE W/DOPPLER COMPLETE: CPT

## 2025-06-16 ENCOUNTER — NON-APPOINTMENT (OUTPATIENT)
Age: 89
End: 2025-06-16

## (undated) DEVICE — PACK GENERAL LAPAROSCOPY

## (undated) DEVICE — WARMING BLANKET UPPER ADULT

## (undated) DEVICE — BLADE SURGICAL #15 CARBON

## (undated) DEVICE — SUT VICRYL 0 27" UR-6

## (undated) DEVICE — TROCAR COVIDIEN BLUNT TIP HASSAN 10MM

## (undated) DEVICE — DRSG MASTISOL

## (undated) DEVICE — ENDOCATCH 10MM SPECIMEN POUCH

## (undated) DEVICE — BASIN SET SINGLE

## (undated) DEVICE — ELCTR BOVIE PENCIL SMOKE EVACUATION

## (undated) DEVICE — DRAPE 3/4 SHEET 52X76"

## (undated) DEVICE — ELCTR GROUNDING PAD ADULT COVIDIEN

## (undated) DEVICE — DISSECTOR ENDOSCOPIC KITTNER SINGLE TIP

## (undated) DEVICE — TUBING INSUFFLATION LAP FILTER 10FT

## (undated) DEVICE — DRSG STERISTRIPS 0.5 X 4"

## (undated) DEVICE — SUT MONOCRYL 4-0 27" PS-2 UNDYED

## (undated) DEVICE — PROTECTOR HEEL / ELBOW

## (undated) DEVICE — APPLICATOR SURGICEL LAP TROCAR POINT 2.5MM X 150MM

## (undated) DEVICE — TUBING OLYMPUS INSUFFLATION

## (undated) DEVICE — POSITIONER STRAP ARMBOARD VELCRO TS-30

## (undated) DEVICE — TROCAR COVIDIEN VERSAPORT BLADELESS OPTICAL 5MM STANDARD

## (undated) DEVICE — SOL IRR POUR H2O 500ML

## (undated) DEVICE — D HELP - CLEARVIEW CLEARIFY SYSTEM

## (undated) DEVICE — SOL INJ NS 0.9% 1000ML

## (undated) DEVICE — DRAPE TOWEL 1010

## (undated) DEVICE — TUBING STRYKEFLOW II SUCTION / IRRIGATOR

## (undated) DEVICE — TIP METZENBAUM SCISSOR MONOPOLAR ENDOCUT (ORANGE)

## (undated) DEVICE — VENODYNE/SCD SLEEVE CALF MEDIUM